# Patient Record
Sex: MALE | Race: WHITE | NOT HISPANIC OR LATINO | Employment: OTHER | URBAN - METROPOLITAN AREA
[De-identification: names, ages, dates, MRNs, and addresses within clinical notes are randomized per-mention and may not be internally consistent; named-entity substitution may affect disease eponyms.]

---

## 2019-04-10 ENCOUNTER — TELEPHONE (OUTPATIENT)
Dept: UROLOGY | Facility: CLINIC | Age: 84
End: 2019-04-10

## 2019-04-10 NOTE — TELEPHONE ENCOUNTER
----- Message from Gogo Diaz sent at 4/10/2019 11:38 AM CDT -----  Contact: Elyjose Chiang (Spouse)  Name of Who is Calling: Ely Chiang (Spouse)      What is the request in detail:    Patient's wife called requesting to schedule a new patient's appointment with Dr. Monteiro as per Dr. Drew Castro.  Please patient's wife a call back at your earliest convenience.     THANKS!      Can the clinic reply by [facility]: NO      Number to Call Back: Ely Chiang (Spouse) / # [phone]

## 2019-04-17 ENCOUNTER — OFFICE VISIT (OUTPATIENT)
Dept: CARDIOLOGY | Facility: CLINIC | Age: 84
End: 2019-04-17
Payer: MEDICARE

## 2019-04-17 ENCOUNTER — ANTI-COAG VISIT (OUTPATIENT)
Dept: CARDIOLOGY | Facility: CLINIC | Age: 84
End: 2019-04-17

## 2019-04-17 ENCOUNTER — TELEPHONE (OUTPATIENT)
Dept: CARDIOLOGY | Facility: CLINIC | Age: 84
End: 2019-04-17

## 2019-04-17 VITALS
WEIGHT: 171 LBS | HEIGHT: 68 IN | SYSTOLIC BLOOD PRESSURE: 124 MMHG | DIASTOLIC BLOOD PRESSURE: 59 MMHG | BODY MASS INDEX: 25.91 KG/M2 | HEART RATE: 63 BPM

## 2019-04-17 DIAGNOSIS — I10 ESSENTIAL HYPERTENSION: ICD-10-CM

## 2019-04-17 DIAGNOSIS — Z95.0 CARDIAC PACEMAKER: ICD-10-CM

## 2019-04-17 DIAGNOSIS — Z79.01 WARFARIN ANTICOAGULATION: ICD-10-CM

## 2019-04-17 DIAGNOSIS — I48.20 CHRONIC ATRIAL FIBRILLATION: ICD-10-CM

## 2019-04-17 DIAGNOSIS — E78.00 PURE HYPERCHOLESTEROLEMIA: ICD-10-CM

## 2019-04-17 DIAGNOSIS — I48.20 CHRONIC ATRIAL FIBRILLATION: Primary | ICD-10-CM

## 2019-04-17 PROCEDURE — 93000 ELECTROCARDIOGRAM COMPLETE: CPT | Mod: S$GLB,,, | Performed by: INTERNAL MEDICINE

## 2019-04-17 PROCEDURE — 93000 EKG 12-LEAD: ICD-10-PCS | Mod: S$GLB,,, | Performed by: INTERNAL MEDICINE

## 2019-04-17 PROCEDURE — 99205 OFFICE O/P NEW HI 60 MIN: CPT | Mod: S$GLB,,, | Performed by: INTERNAL MEDICINE

## 2019-04-17 PROCEDURE — 99205 PR OFFICE/OUTPT VISIT, NEW, LEVL V, 60-74 MIN: ICD-10-PCS | Mod: S$GLB,,, | Performed by: INTERNAL MEDICINE

## 2019-04-17 NOTE — PROGRESS NOTES
Subjective:      Patient ID: Stone Lucio is a 89 y.o. male.    Chief Complaint: Coronary Artery Disease (Establishing with a new cardiologist)    HPI:  Pt referred by Dr Castro for evaluation of chronic atrial fibrillation and f/u permanent pacemaker.    Pacemaker was placed years ago.    Pt walks with a walker    Pt is limited by chronic pain in LLE.    Review of Systems   Cardiovascular: Positive for leg swelling (minimal LLE). Negative for chest pain, claudication, dyspnea on exertion, irregular heartbeat, near-syncope, orthopnea, palpitations and syncope.      Pt is a retired  and   Past Medical History:   Diagnosis Date    Atrial fibrillation     Cancer     Hyperlipidemia     Hypertension     Prostate cancer     on Lupron        Past Surgical History:   Procedure Laterality Date    CHOLECYSTECTOMY      INSERT / REPLACE / REMOVE PACEMAKER      JOINT REPLACEMENT      left hip ORIF      SKIN CANCER DESTRUCTION         Family History   Problem Relation Age of Onset    Colon cancer Sister     Colon cancer Sister        Social History     Socioeconomic History    Marital status:      Spouse name: Not on file    Number of children: Not on file    Years of education: Not on file    Highest education level: Not on file   Occupational History    Not on file   Social Needs    Financial resource strain: Not on file    Food insecurity:     Worry: Not on file     Inability: Not on file    Transportation needs:     Medical: Not on file     Non-medical: Not on file   Tobacco Use    Smoking status: Former Smoker     Last attempt to quit: 1990     Years since quittin.0    Smokeless tobacco: Never Used   Substance and Sexual Activity    Alcohol use: Yes    Drug use: Not on file    Sexual activity: Not on file   Lifestyle    Physical activity:     Days per week: Not on file     Minutes per session: Not on file    Stress: Not on file   Relationships     Social connections:     Talks on phone: Not on file     Gets together: Not on file     Attends Hinduism service: Not on file     Active member of club or organization: Not on file     Attends meetings of clubs or organizations: Not on file     Relationship status: Not on file   Other Topics Concern    Not on file   Social History Narrative    Not on file       Current Outpatient Medications on File Prior to Visit   Medication Sig Dispense Refill    acetaminophen (TYLENOL) 325 MG tablet 1-2 tabs Q8h prn pain  0    amLODIPine (NORVASC) 2.5 MG tablet Take 1 tablet (2.5 mg total) by mouth once daily. 30 tablet 11    ascorbic acid, vitamin C, (VITAMIN C) 500 mg Chew 1 at bedtime.  Chew and swallow  0    bicalutamide (CASODEX) 50 MG Tab Take 1 tablet (50 mg total) by mouth once daily. 30 tablet 11    calcium carbonate (OS-STACY) 500 mg calcium (1,250 mg) tablet Take 1 tablet (500 mg total) by mouth once daily. May use 1-2 tabs q 6h prn indigestion  0    cholecalciferol, vitamin D3, (VITAMIN D3) 1,000 unit capsule Take 1 capsule (1,000 Units total) by mouth once daily.  0    docusate sodium (COLACE) 100 MG capsule 2 daily prn constipation  0    furosemide (LASIX) 20 MG tablet Take 1 tablet (20 mg total) by mouth once daily. 30 tablet 11    leuprolide acetate (LUPRON DEPOT IM) Inject into the muscle.      multivit with min-folic acid (ONE-A-DAY MEN VITACRAVES) 200 mcg Chew 1 daily      simvastatin (ZOCOR) 10 MG tablet Take 1 tablet (10 mg total) by mouth every evening. 30 tablet 11    warfarin (COUMADIN) 3 MG tablet Take 1.5 tabs in the evening on Sunday, Monday, Wednesday,Thursday,Friday 30 tablet 11    warfarin (COUMADIN) 6 MG tablet Take 1 tab in the evening on Tuesday and Saturday. 30 tablet 0     No current facility-administered medications on file prior to visit.        Review of patient's allergies indicates:  No Known Allergies  Objective:     Vitals:    04/17/19 1447   BP: (!) 124/59   BP  "Location: Left arm   Patient Position: Sitting   BP Method: Medium (Automatic)   Pulse: 63   Weight: 77.6 kg (171 lb)   Height: 5' 8" (1.727 m)        Physical Exam   Constitutional: He is oriented to person, place, and time. He appears well-developed and well-nourished. No distress.   Eyes: No scleral icterus.   Neck: No JVD present. Carotid bruit is not present.   Cardiovascular: An irregularly irregular rhythm present. Exam reveals no gallop and no friction rub.   Murmur (II/VI systolic) heard.  Pulmonary/Chest: Effort normal and breath sounds normal. No respiratory distress.   Musculoskeletal: He exhibits no edema.   Neurological: He is alert and oriented to person, place, and time.   Skin: Skin is warm and dry. He is not diaphoretic.   Psychiatric: He has a normal mood and affect. His behavior is normal. Judgment and thought content normal.   Vitals reviewed.     ECG: atrial fibrillation with controlled ventricular rate, RBBB, demand pacing    August lab:  LDL 63  HDL 61  TSH 1.93  Hgb 15  Creat 1.06  Assessment:     1. Chronic atrial fibrillation    2. Cardiac pacemaker    3. Essential hypertension    4. Pure hypercholesterolemia    5. Warfarin anticoagulation      Plan:   Stone was seen today for coronary artery disease.    Diagnoses and all orders for this visit:    Chronic atrial fibrillation  -     IN OFFICE EKG 12-LEAD (to [place])  -     Ambulatory consult to Anticoagulation Monitoring  -     CBC auto differential; Future  -     Comprehensive metabolic panel; Future  -     TSH; Future  -     Protime-INR; Standing    Cardiac pacemaker    Essential hypertension    Pure hypercholesterolemia  -     Lipid panel; Future    Warfarin anticoagulation  -     Ambulatory consult to Anticoagulation Monitoring  -     Protime-INR; Standing       Begin [employer] remote pacemaker interrogations (assuming pacemaker is [employer]    Consult coumadin clinic    Same meds    Discussed alternative of Eliquis    RTC " 3 months with [facility]    Follow up in about 3 months (around 7/17/2019), or with [facility].

## 2019-04-17 NOTE — PROGRESS NOTES
89 y/o male establishing care with new Cardiologist. His PMH is significant for atrial fibrillation (seems warfarin is not new), CAD, HTN, HLD, prostate cancer.    Seems patient is not new to warfarin. Will contact patient 4/18 to confirm dose shown in med list is correct and get him scheduled with us.

## 2019-04-17 NOTE — TELEPHONE ENCOUNTER
Phoned [facility] for information on patient's pacemaker.  Patient has a single lead pacemaker, model # K062, Serial # [id number], DOI 01/21/2013.

## 2019-04-17 NOTE — LETTER
April 17, 2019      Drew Castro MD  [address]  [address]  [facility] [address]           [facility]  [address], [address]  [facility] [address]  Phone: [phone]  Fax: [phone]          Patient: Stone Lucio   MR Number: [id number]   YOB: 1921   Date of Visit: 4/17/2019       Dear Dr. Drew Castro:    Thank you for referring Stone Lucio to me for evaluation. Attached you will find relevant portions of my assessment and plan of care.    If you have questions, please do not hesitate to call me. I look forward to following Stone Lucio along with you.    Sincerely,    Alberto Gonzales MD    Enclosure  CC:  No Recipients    If you would like to receive this communication electronically, please contact [email or web address] or [phone] to request more information on [employer] Link access.    For providers and/or their staff who would like to refer a patient to [facility], please contact us through our one-stop-shop provider referral line, [facility], at [phone].    If you feel you have received this communication in error or would no longer like to receive these types of communications, please e-mail [email or web address]

## 2019-04-18 NOTE — PROGRESS NOTES
I spoke to patient's wife per patient requested as she handles his health care.  He lives at [facility] for assisted living.  His wife brings him for appointments but [facility] dispenses his medications.  So he will need appointments scheduled with his wife and [facility] will need to be contacted when questioning and dosing needed.  Patient is schedule for an education clinic appointment on 4/25/19.  I left a message for Fernanda Montgomery at [facility] to call back to verify Coumadin and see how they will need dosing changes sent as they dispense his medications.    I spoke to Fernanda Montgomery at [facility] and she can be contacted for questions and dosing has to be faxed in an order format to [facility] as patient is there for assisted living. Fernanda verified the patient's Coumadin is 3 mg tablet strength which he gets 2 tabs on Tuesday, Saturday and 2 tabs all other days.  He has been on this dosing since he was admitted there on 3/12/2018

## 2019-04-25 ENCOUNTER — ANTI-COAG VISIT (OUTPATIENT)
Dept: CARDIOLOGY | Facility: CLINIC | Age: 84
End: 2019-04-25
Payer: MEDICARE

## 2019-04-25 DIAGNOSIS — Z79.01 LONG TERM (CURRENT) USE OF ANTICOAGULANTS: Primary | ICD-10-CM

## 2019-04-25 DIAGNOSIS — I48.20 CHRONIC ATRIAL FIBRILLATION: ICD-10-CM

## 2019-04-25 DIAGNOSIS — Z79.01 WARFARIN ANTICOAGULATION: ICD-10-CM

## 2019-04-25 LAB — INR PPP: 2.4 (ref 2–3)

## 2019-04-25 PROCEDURE — 93793 ANTICOAG MGMT PT WARFARIN: CPT | Mod: ,,,

## 2019-04-25 PROCEDURE — 85610 PROTHROMBIN TIME: CPT | Mod: PBBFAC

## 2019-04-25 PROCEDURE — 93793 PR ANTICOAGULANT MGMT FOR PT TAKING WARFARIN: ICD-10-PCS | Mod: ,,,

## 2019-04-25 NOTE — PROGRESS NOTES
Patient here for new patient education. He is not new to coumadin. He avoids greens, ETOH occasionally and denies any history of GI bleed. He reports minimal bruising and denies any bleeding. We will maintain this current dose and follow up in 2 weeks. A full discussion of the nature of anticoagulants has been carried out.  A benefit risk analysis has been presented to the patient, so that they understand the justification for choosing anticoagulation at this time. The need for frequent and regular monitoring, precise dosage adjustment and compliance is stressed.  Side effects of potential bleeding are discussed.  The patient should avoid any OTC items containing aspirin or ibuprofen, and should avoid great swings in general diet.  Avoid alcohol consumption.  Call if any signs of abnormal bleeding. Orders faxed to [facility]. Care plan made with MENDEZ GORE

## 2019-05-10 NOTE — PROGRESS NOTES
Wife called 05/10/19 to lanre appt 05/10/19 to 05/15/19. Patient ([place]isted [place]nee) and cannot get in car. Also poor weather conditions.

## 2019-05-15 ENCOUNTER — ANTI-COAG VISIT (OUTPATIENT)
Dept: CARDIOLOGY | Facility: CLINIC | Age: 84
End: 2019-05-15
Payer: MEDICARE

## 2019-05-15 DIAGNOSIS — I48.20 CHRONIC ATRIAL FIBRILLATION: ICD-10-CM

## 2019-05-15 DIAGNOSIS — Z79.01 WARFARIN ANTICOAGULATION: Primary | ICD-10-CM

## 2019-05-15 LAB — INR PPP: 2.1 (ref 2–3)

## 2019-05-15 PROCEDURE — 85610 PROTHROMBIN TIME: CPT | Mod: PBBFAC

## 2019-05-15 PROCEDURE — 93793 ANTICOAG MGMT PT WARFARIN: CPT | Mod: ,,, | Performed by: PHARMACIST

## 2019-05-15 PROCEDURE — 93793 PR ANTICOAGULANT MGMT FOR PT TAKING WARFARIN: ICD-10-PCS | Mod: ,,, | Performed by: PHARMACIST

## 2019-05-17 ENCOUNTER — OFFICE VISIT (OUTPATIENT)
Dept: UROLOGY | Facility: CLINIC | Age: 84
End: 2019-05-17
Payer: MEDICARE

## 2019-05-17 VITALS
DIASTOLIC BLOOD PRESSURE: 69 MMHG | HEART RATE: 75 BPM | BODY MASS INDEX: 25.92 KG/M2 | WEIGHT: 171.06 LBS | SYSTOLIC BLOOD PRESSURE: 135 MMHG | HEIGHT: 68 IN

## 2019-05-17 DIAGNOSIS — C61 PC (PROSTATE CANCER): Primary | ICD-10-CM

## 2019-05-17 PROCEDURE — 99204 PR OFFICE/OUTPT VISIT, NEW, LEVL IV, 45-59 MIN: ICD-10-PCS | Mod: S$GLB,,, | Performed by: UROLOGY

## 2019-05-17 PROCEDURE — 99204 OFFICE O/P NEW MOD 45 MIN: CPT | Mod: S$GLB,,, | Performed by: UROLOGY

## 2019-05-17 NOTE — PROGRESS NOTES
"Subjective:      Stone Lucio is a 89 y.o. male who was referred by Dr Castro for evaluation of prostate cancer.    The patient has been treated in [place] by Dr. Aguiar for proximally 10 years with androgen deprivation for prostate cancer  Under limited records are available    He tells me he was diagnosed due to elevated PSA.  He has been treated with Lupron injections and Casodex for about 10 years.  To his knowledge the disease was not metastatic at the time of diagnosis.  He has had four fractures but tells me these preceded his androgen deprivation      His last injection of Lupron was February 2019.  He and his wife were told that his next injection is due in August    Overall he is doing quite well and is very sharp for 89-year-old.  He lives in [facility].  Appetite is adequate no recent weight loss        The following portions of the patient's history were reviewed and updated as appropriate: allergies, current medications, past family history, past medical history, past social history, past surgical history and problem list.    Review of Systems  Constitutional: no fever or chills  ENT: no nasal congestion or sore throat  Respiratory: no cough or shortness of breath  Cardiovascular: no chest pain or palpitations  Gastrointestinal: no nausea or vomiting, tolerating diet  Genitourinary: as per HPI  Hematologic/Lymphatic: no easy bruising or lymphadenopathy  Musculoskeletal: no arthralgias or myalgias, Recently twisted his left knee  Neurological: no seizures or tremors  Behavioral/Psych: no auditory or visual hallucinations     Objective:   Vitals: /69 (BP Location: Left arm, Patient Position: Sitting, BP Method: Large (Automatic))   Pulse 75   Ht 5' 8" (1.727 m)   Wt 77.6 kg (171 lb 1.2 oz)   BMI 26.01 kg/m²     Physical Exam   General: alert and oriented, no acute distress  Head: normocephalic, atraumatic  Neck: normal ROM  Respiratory: Symmetric expansion, non-labored " breathing  Cardiovascular: no peripheral edema  Abdomen: soft, non tender, non distended, no palpable masses, no hernias, no hepatomegaly or splenomegaly  Genitourinary:     Prostate:  Fossa is smooth no masses no nodules; seminal vesicles not palpated  Rectum: normal rectal tone, no rectal mass, normal perineum  Lymphatic:  No axillary or cervical adenopathy  Skin: normal coloration and turgor, no rashes, no suspicious skin lesions noted  Neuro: alert and oriented x3, no gross deficits  Psych: normal judgment and insight, normal mood/affect and non-anxious  No bony tenderness    Physical Exam    Lab Review   Urinalysis demonstrates no specimen    PSA 0.2  2019    No results found for: WBC, HGB, HCT, MCV, PLT  No results found for: CREATININE, BUN  No results found for: PSA  Imaging  -  Assessment:     1. PC (prostate cancer)      Doing well on androgen deprivation  No staging information is available in the records received    Plan:     Orders Placed This Encounter    Prostate Specific Antigen, Diagnostic    Testosterone     We will recheck PSA and testosterone today  Continue Casodex 50 mg daily    Return to clinic August 2009 for Eligard 45 mg  Calcium and vitamin-D    Consider referral to Hematology Oncology to do to discuss Xgeva/ Zometa given his history of fractures and long-term androgen deprivation; will forward note to Dr. Castro

## 2019-06-12 ENCOUNTER — ANTI-COAG VISIT (OUTPATIENT)
Dept: CARDIOLOGY | Facility: CLINIC | Age: 84
End: 2019-06-12
Payer: MEDICARE

## 2019-06-12 DIAGNOSIS — Z79.01 WARFARIN ANTICOAGULATION: ICD-10-CM

## 2019-06-12 DIAGNOSIS — Z79.01 LONG TERM (CURRENT) USE OF ANTICOAGULANTS: Primary | ICD-10-CM

## 2019-06-12 DIAGNOSIS — I48.20 CHRONIC ATRIAL FIBRILLATION: ICD-10-CM

## 2019-06-12 LAB — INR PPP: 3.1 (ref 2–3)

## 2019-06-12 PROCEDURE — 93793 PR ANTICOAGULANT MGMT FOR PT TAKING WARFARIN: ICD-10-PCS | Mod: ,,,

## 2019-06-12 PROCEDURE — 85610 PROTHROMBIN TIME: CPT | Mod: PBBFAC

## 2019-06-12 PROCEDURE — 93793 ANTICOAG MGMT PT WARFARIN: CPT | Mod: ,,,

## 2019-06-12 NOTE — PROGRESS NOTES
INR not at goal. Medications, chart, and patient findings reviewed. See calendar for adjustments to dose and follow up plan.  Pt findings: Pt had diarrhea at the beginning of the week (due to food) & pt denies any other changes.  Will have pt take 1.5mg 6/12 & resume current regimen.  Plan to re-assess pt in 3 weeks.

## 2019-07-02 ENCOUNTER — ANTI-COAG VISIT (OUTPATIENT)
Dept: CARDIOLOGY | Facility: CLINIC | Age: 84
End: 2019-07-02
Payer: MEDICARE

## 2019-07-02 DIAGNOSIS — Z79.01 LONG TERM (CURRENT) USE OF ANTICOAGULANTS: Primary | ICD-10-CM

## 2019-07-02 DIAGNOSIS — I48.20 CHRONIC ATRIAL FIBRILLATION: ICD-10-CM

## 2019-07-02 DIAGNOSIS — Z79.01 WARFARIN ANTICOAGULATION: ICD-10-CM

## 2019-07-02 LAB — INR PPP: 2.7 (ref 2–3)

## 2019-07-02 PROCEDURE — 93793 PR ANTICOAGULANT MGMT FOR PT TAKING WARFARIN: ICD-10-PCS | Mod: ,,,

## 2019-07-02 PROCEDURE — 93793 ANTICOAG MGMT PT WARFARIN: CPT | Mod: ,,,

## 2019-07-02 PROCEDURE — 85610 PROTHROMBIN TIME: CPT | Mod: PBBFAC

## 2019-07-02 NOTE — PROGRESS NOTES
INR at goal. Medications and chart reviewed. No changes noted to necessitate adjustment of warfarin or follow-up plan. See calendar.  Pt denies any changes.  Will maintain current regimen & re-assess in 4 weeks.  Sent to [facility].   Patient was re-educated on situations that would require placing a call to the Coumadin Clinic, including bleeding or unusual bruising issues, changes in health, diet or medications,upcoming procedures that require warfarin interruption, and missed Coumadin dose(s). Patient expressed understanding that avoidance of consistency with these parameters could cause fluctuations in INR, leading to more frequent visits and increase risk of adverse events.

## 2019-07-30 ENCOUNTER — ANTI-COAG VISIT (OUTPATIENT)
Dept: CARDIOLOGY | Facility: CLINIC | Age: 84
End: 2019-07-30
Payer: MEDICARE

## 2019-07-30 DIAGNOSIS — Z79.01 WARFARIN ANTICOAGULATION: ICD-10-CM

## 2019-07-30 DIAGNOSIS — Z79.01 LONG TERM (CURRENT) USE OF ANTICOAGULANTS: Primary | ICD-10-CM

## 2019-07-30 DIAGNOSIS — I48.20 CHRONIC ATRIAL FIBRILLATION: ICD-10-CM

## 2019-07-30 LAB — INR PPP: 3.2 (ref 2–3)

## 2019-07-30 PROCEDURE — 93793 PR ANTICOAGULANT MGMT FOR PT TAKING WARFARIN: ICD-10-PCS | Mod: ,,,

## 2019-07-30 PROCEDURE — 93793 ANTICOAG MGMT PT WARFARIN: CPT | Mod: ,,,

## 2019-07-30 PROCEDURE — 85610 PROTHROMBIN TIME: CPT | Mod: PBBFAC

## 2019-07-30 NOTE — PROGRESS NOTES
INR not at goal. Medications, chart, and patient findings reviewed. See calendar for adjustments to dose and follow up plan.  Pt findings: Pt states he has been avoiding greens; pt has began to work more w/ a walker & is less dependent on his wheelchair; pt eats nuts; pt also takes Prevagen (apoaequorin) and denies any other changes.  Pt states he does not take Prevagen consistently (no DDI info available).  Pt's alcohol intake is also irregular.  Will reduce pt's maintenance dose preemptively due to inconsistency w/ alcohol intake.  Plan to re-assess in 2 weeks.   Patient was re-educated on situations that would require placing a call to the Coumadin Clinic, including bleeding or unusual bruising issues, changes in health, diet or medications,upcoming procedures that require warfarin interruption, and missed Coumadin dose(s). Patient expressed understanding that avoidance of consistency with these parameters could cause fluctuations in INR, leading to more frequent visits and increase risk of adverse events.

## 2019-08-02 ENCOUNTER — OFFICE VISIT (OUTPATIENT)
Dept: CARDIOLOGY | Facility: CLINIC | Age: 84
End: 2019-08-02
Payer: MEDICARE

## 2019-08-02 VITALS
HEART RATE: 82 BPM | DIASTOLIC BLOOD PRESSURE: 60 MMHG | WEIGHT: 163 LBS | BODY MASS INDEX: 24.71 KG/M2 | SYSTOLIC BLOOD PRESSURE: 129 MMHG | HEIGHT: 68 IN

## 2019-08-02 DIAGNOSIS — I69.398 ALTERED MOBILITY DUE TO OLD STROKE: ICD-10-CM

## 2019-08-02 DIAGNOSIS — E78.00 PURE HYPERCHOLESTEROLEMIA: ICD-10-CM

## 2019-08-02 DIAGNOSIS — R26.9 ALTERED MOBILITY DUE TO OLD STROKE: ICD-10-CM

## 2019-08-02 DIAGNOSIS — Z79.01 WARFARIN ANTICOAGULATION: ICD-10-CM

## 2019-08-02 DIAGNOSIS — I48.20 CHRONIC ATRIAL FIBRILLATION: Primary | ICD-10-CM

## 2019-08-02 DIAGNOSIS — I10 ESSENTIAL HYPERTENSION: ICD-10-CM

## 2019-08-02 DIAGNOSIS — Z95.0 CARDIAC PACEMAKER: ICD-10-CM

## 2019-08-02 PROCEDURE — 93000 EKG 12-LEAD: ICD-10-PCS | Mod: S$GLB,,, | Performed by: INTERNAL MEDICINE

## 2019-08-02 PROCEDURE — 99214 PR OFFICE/OUTPT VISIT, EST, LEVL IV, 30-39 MIN: ICD-10-PCS | Mod: S$GLB,,, | Performed by: INTERNAL MEDICINE

## 2019-08-02 PROCEDURE — 99214 OFFICE O/P EST MOD 30 MIN: CPT | Mod: S$GLB,,, | Performed by: INTERNAL MEDICINE

## 2019-08-02 PROCEDURE — 93000 ELECTROCARDIOGRAM COMPLETE: CPT | Mod: S$GLB,,, | Performed by: INTERNAL MEDICINE

## 2019-08-02 NOTE — PROGRESS NOTES
Subjective:      Patient ID: Stone Lucio is a 89 y.o. male.    Chief Complaint: Follow-up (Afib)    HPI:  Lives at [facility]    Remote hx of hip fx.    Fell again 2 months ago.  No syncope.  Pt was in the bathroom trying to reach something and slipped and fell.    Able to walk a block or two with the walker before having to stop due to tired leg.    Review of Systems   Cardiovascular: Negative for chest pain, claudication, dyspnea on exertion, irregular heartbeat, leg swelling, near-syncope, orthopnea, palpitations and syncope.        Past Medical History:   Diagnosis Date    Atrial fibrillation     Cancer     Hyperlipidemia     Hypertension     Prostate cancer     on Lupron        Past Surgical History:   Procedure Laterality Date    CHOLECYSTECTOMY      INSERT / REPLACE / REMOVE PACEMAKER      JOINT REPLACEMENT      left hip ORIF      SKIN CANCER DESTRUCTION         Family History   Problem Relation Age of Onset    Colon cancer Sister     Colon cancer Sister        Social History     Socioeconomic History    Marital status:      Spouse name: Not on file    Number of children: Not on file    Years of education: Not on file    Highest education level: Not on file   Occupational History    Not on file   Social Needs    Financial resource strain: Not on file    Food insecurity:     Worry: Not on file     Inability: Not on file    Transportation needs:     Medical: Not on file     Non-medical: Not on file   Tobacco Use    Smoking status: Former Smoker     Last attempt to quit: 1990     Years since quittin.3    Smokeless tobacco: Never Used   Substance and Sexual Activity    Alcohol use: Yes    Drug use: Never    Sexual activity: Not Currently   Lifestyle    Physical activity:     Days per week: Not on file     Minutes per session: Not on file    Stress: Not on file   Relationships    Social connections:     Talks on phone: Not on file     Gets together: Not on file      Attends Mosque service: Not on file     Active member of club or organization: Not on file     Attends meetings of clubs or organizations: Not on file     Relationship status: Not on file   Other Topics Concern    Not on file   Social History Narrative    Not on file       Current Outpatient Medications on File Prior to Visit   Medication Sig Dispense Refill    acetaminophen (TYLENOL) 325 MG tablet 1-2 tabs Q8h prn pain  0    amLODIPine (NORVASC) 2.5 MG tablet Take 1 tablet (2.5 mg total) by mouth once daily. 30 tablet 11    ascorbic acid, vitamin C, (VITAMIN C) 500 mg Chew 1 at bedtime.  Chew and swallow  0    bicalutamide (CASODEX) 50 MG Tab Take 1 tablet (50 mg total) by mouth once daily. 30 tablet 11    calcium carbonate (OS-STACY) 500 mg calcium (1,250 mg) tablet Take 1 tablet (500 mg total) by mouth once daily. May use 1-2 tabs q 6h prn indigestion  0    cholecalciferol, vitamin D3, (VITAMIN D3) 1,000 unit capsule Take 1 capsule (1,000 Units total) by mouth once daily.  0    docusate sodium (COLACE) 100 MG capsule 2 daily prn constipation  0    furosemide (LASIX) 20 MG tablet Take 1 tablet (20 mg total) by mouth once daily. 30 tablet 11    leuprolide acetate (LUPRON DEPOT IM) Inject into the muscle.      multivit with min-folic acid (ONE-A-DAY MEN VITACRAVES) 200 mcg Chew 1 daily      simvastatin (ZOCOR) 10 MG tablet Take 1 tablet (10 mg total) by mouth every evening. 30 tablet 11    warfarin (COUMADIN) 3 MG tablet Take 1.5 tabs in the evening on Sunday, Monday, Wednesday,Thursday,Friday 30 tablet 11    warfarin (COUMADIN) 6 MG tablet Take 1 tab in the evening on Tuesday and Saturday. 30 tablet 0     No current facility-administered medications on file prior to visit.        Review of patient's allergies indicates:  No Known Allergies  Objective:     Vitals:    08/02/19 0906 08/02/19 0943   BP: (!) 144/75 129/60   BP Location: Left arm Left arm   Patient Position: Sitting Sitting   BP  "Method: Large (Automatic)    Pulse: 82    Weight: 73.9 kg (163 lb)    Height: 5' 8" (1.727 m)         Physical Exam   Constitutional: He is oriented to person, place, and time. He appears well-developed and well-nourished. No distress.   Eyes: No scleral icterus.   Neck: No JVD present. Carotid bruit is not present.   Cardiovascular: Normal heart sounds. An irregularly irregular rhythm present. Exam reveals no gallop and no friction rub.   No murmur heard.  Pulmonary/Chest: Effort normal and breath sounds normal. No respiratory distress.   Musculoskeletal: He exhibits edema (trace pitting).   Neurological: He is alert and oriented to person, place, and time.   Skin: Skin is warm and dry. He is not diaphoretic.   Psychiatric: He has a normal mood and affect. His behavior is normal. Judgment and thought content normal.          ECG: atrial fibrillation with controlled ventricular response, RBBB, nonspecific STT abnormality    With magnet applied --VOO at 99 bpm    Last INR 3.2, warfarin dose reduced.    [facility] showed old stoke with right leg weakness in August of 2014  Pacemaker placed due to a fib with 3 second pauses  Echo showed normal LVEF and mod MR  No critical stenosis on carotid ultrasound  Assessment:     1. Chronic atrial fibrillation    2. 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 Cardiac pacemaker    4. Essential hypertension    5. Pure hypercholesterolemia    6. Warfarin anticoagulation      Plan:   Stone was seen today for follow-up.    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  "

## 2019-08-13 ENCOUNTER — TELEPHONE (OUTPATIENT)
Dept: CARDIOLOGY | Facility: CLINIC | Age: 84
End: 2019-08-13

## 2019-08-13 ENCOUNTER — ANTI-COAG VISIT (OUTPATIENT)
Dept: CARDIOLOGY | Facility: CLINIC | Age: 84
End: 2019-08-13
Payer: MEDICARE

## 2019-08-13 DIAGNOSIS — Z79.01 WARFARIN ANTICOAGULATION: ICD-10-CM

## 2019-08-13 DIAGNOSIS — Z79.01 LONG TERM (CURRENT) USE OF ANTICOAGULANTS: Primary | ICD-10-CM

## 2019-08-13 DIAGNOSIS — I48.20 CHRONIC ATRIAL FIBRILLATION: ICD-10-CM

## 2019-08-13 LAB — INR PPP: 2.5 (ref 2–3)

## 2019-08-13 PROCEDURE — 85610 PROTHROMBIN TIME: CPT | Mod: PBBFAC

## 2019-08-13 PROCEDURE — 93793 ANTICOAG MGMT PT WARFARIN: CPT | Mod: ,,,

## 2019-08-13 PROCEDURE — 93793 PR ANTICOAGULANT MGMT FOR PT TAKING WARFARIN: ICD-10-PCS | Mod: ,,,

## 2019-08-13 NOTE — PROGRESS NOTES
INR at goal. Medications and chart reviewed. No changes noted to necessitate adjustment of warfarin or follow-up plan. See calendar.  Pt denies any changes & had the same amount of alcohol intake within the past 2 weeks.  Will maintain the reduced dose & re-assess in 3 weeks.

## 2019-08-13 NOTE — TELEPHONE ENCOUNTER
Reached out to [facility] for patient's pacemaker information    Patient's device is a [employer] Model # K062 inplanted 01/21/2013.

## 2019-08-14 DIAGNOSIS — C61 PC (PROSTATE CANCER): Primary | ICD-10-CM

## 2019-08-23 ENCOUNTER — OFFICE VISIT (OUTPATIENT)
Dept: UROLOGY | Facility: CLINIC | Age: 84
End: 2019-08-23
Payer: MEDICARE

## 2019-08-23 VITALS
SYSTOLIC BLOOD PRESSURE: 123 MMHG | HEART RATE: 70 BPM | BODY MASS INDEX: 24.25 KG/M2 | HEIGHT: 68 IN | DIASTOLIC BLOOD PRESSURE: 60 MMHG | WEIGHT: 160 LBS

## 2019-08-23 DIAGNOSIS — C61 PC (PROSTATE CANCER): Primary | ICD-10-CM

## 2019-08-23 PROCEDURE — 99214 PR OFFICE/OUTPT VISIT, EST, LEVL IV, 30-39 MIN: ICD-10-PCS | Mod: 25,S$GLB,, | Performed by: UROLOGY

## 2019-08-23 PROCEDURE — 99214 OFFICE O/P EST MOD 30 MIN: CPT | Mod: 25,S$GLB,, | Performed by: UROLOGY

## 2019-08-23 PROCEDURE — 96402 PR CHEMOTHER HORMON ANTINEOPL SUB-Q/IM: ICD-10-PCS | Mod: S$GLB,,, | Performed by: UROLOGY

## 2019-08-23 PROCEDURE — 96402 CHEMO HORMON ANTINEOPL SQ/IM: CPT | Mod: S$GLB,,, | Performed by: UROLOGY

## 2019-08-23 NOTE — PROGRESS NOTES
"Subjective:      Stone Lucio is a 89 y.o. male who was referred by Dr Castro for evaluation of prostate cancer.    The patient has been treated in [place] by Dr. Aguiar for proximally 10 years with androgen deprivation for prostate cancer  Under limited records are available    He tells me he was diagnosed due to elevated PSA.  He has been treated with Lupron injections and Casodex for about 10 years.  To his knowledge the disease was not metastatic at the time of diagnosis.  He has had four fractures but tells me these preceded his androgen deprivation      His last injection of Lupron was February 2019.  He and his wife were told that his next injection is due in August    Overall he is doing quite well and is very sharp for 89-year-old.  He lives in [facility].  Appetite is adequate no recent weight loss        The following portions of the patient's history were reviewed and updated as appropriate: allergies, current medications, past family history, past medical history, past social history, past surgical history and problem list.    Review of Systems  Constitutional: no fever or chills  ENT: no nasal congestion or sore throat  Respiratory: no cough or shortness of breath  Cardiovascular: no chest pain or palpitations  Gastrointestinal: no nausea or vomiting, tolerating diet  Genitourinary: as per HPI  Hematologic/Lymphatic: no easy bruising or lymphadenopathy  Musculoskeletal: no arthralgias or myalgias, Recently twisted his left knee  Neurological: no seizures or tremors  Behavioral/Psych: no auditory or visual hallucinations     Objective:   Vitals: /60   Pulse 70   Ht 5' 8" (1.727 m)   Wt 72.6 kg (160 lb)   BMI 24.33 kg/m²     Physical Exam   General: alert and oriented, no acute distress  Head: normocephalic, atraumatic  Neck: normal ROM  Respiratory: Symmetric expansion, non-labored breathing  Cardiovascular: no peripheral edema  Abdomen: soft, non tender, non distended, no palpable masses, no " hernias, no hepatomegaly or splenomegaly  Genitourinary:     Prostate:  Fossa is smooth no masses no nodules; seminal vesicles not palpated  Rectum: normal rectal tone, no rectal mass, normal perineum  Lymphatic:  No axillary or cervical adenopathy  Skin: normal coloration and turgor, no rashes, no suspicious skin lesions noted  Neuro: alert and oriented x3, no gross deficits  Psych: normal judgment and insight, normal mood/affect and non-anxious  No bony tenderness    Physical Exam    Lab Review   Urinalysis demonstrates no specimen    PSA 0.2  2019; in 05/2019 PSA 0.12, Testosterone 15      Assessment:     1. PC (prostate cancer)      Doing well on androgen deprivation.   No staging information is available in the records received    Plan:        Eligard 45 given today.   Continue Casodex 50 mg daily, calcium, and vitamin D.   Discussed possibility of patient seeing Heme-Onc to discuss starting Xgeva; patient declines to visit Heme-Onc for now, he is happy with how he is doing.  RTC 6 months for Eligard. Will repeat PSA and testosterone then.

## 2019-09-05 ENCOUNTER — ANTI-COAG VISIT (OUTPATIENT)
Dept: CARDIOLOGY | Facility: CLINIC | Age: 84
End: 2019-09-05
Payer: MEDICARE

## 2019-09-05 DIAGNOSIS — Z79.01 WARFARIN ANTICOAGULATION: ICD-10-CM

## 2019-09-05 DIAGNOSIS — Z79.01 LONG TERM (CURRENT) USE OF ANTICOAGULANTS: Primary | ICD-10-CM

## 2019-09-05 DIAGNOSIS — I48.20 CHRONIC ATRIAL FIBRILLATION: ICD-10-CM

## 2019-09-05 LAB — INR PPP: 2.1 (ref 2–3)

## 2019-09-05 PROCEDURE — 93793 PR ANTICOAGULANT MGMT FOR PT TAKING WARFARIN: ICD-10-PCS | Mod: ,,,

## 2019-09-05 PROCEDURE — 85610 PROTHROMBIN TIME: CPT | Mod: PBBFAC

## 2019-09-05 PROCEDURE — 93793 ANTICOAG MGMT PT WARFARIN: CPT | Mod: ,,,

## 2019-09-05 NOTE — PROGRESS NOTES
INR at goal. Medications and chart reviewed. No changes noted to necessitate adjustment of warfarin or follow-up plan. See calendar.  Pt denies any changes.  Will maintain current regimen & re-assess in 3 weeks.  If pt remains stable plan to extend follow-up.   Patient was re-educated on situations that would require placing a call to the Coumadin Clinic, including bleeding or unusual bruising issues, changes in health, diet or medications,upcoming procedures that require warfarin interruption, and missed Coumadin dose(s). Patient expressed understanding that avoidance of consistency with these parameters could cause fluctuations in INR, leading to more frequent visits and increase risk of adverse events.

## 2019-09-24 ENCOUNTER — ANTI-COAG VISIT (OUTPATIENT)
Dept: CARDIOLOGY | Facility: CLINIC | Age: 84
End: 2019-09-24
Payer: MEDICARE

## 2019-09-24 DIAGNOSIS — I48.20 CHRONIC ATRIAL FIBRILLATION: ICD-10-CM

## 2019-09-24 DIAGNOSIS — Z79.01 WARFARIN ANTICOAGULATION: ICD-10-CM

## 2019-09-24 DIAGNOSIS — Z79.01 LONG TERM (CURRENT) USE OF ANTICOAGULANTS: Primary | ICD-10-CM

## 2019-09-24 LAB — INR PPP: 2.9 (ref 2–3)

## 2019-09-24 PROCEDURE — 93793 PR ANTICOAGULANT MGMT FOR PT TAKING WARFARIN: ICD-10-PCS | Mod: ,,,

## 2019-09-24 PROCEDURE — 85610 PROTHROMBIN TIME: CPT | Mod: PBBFAC

## 2019-09-24 PROCEDURE — 93793 ANTICOAG MGMT PT WARFARIN: CPT | Mod: ,,,

## 2019-09-24 NOTE — PROGRESS NOTES
INR at goal. Medications and chart reviewed. No changes noted to necessitate adjustment of warfarin or follow-up plan. See calendar.  Findings: Pt states he started drinking cranberry juice but doesn't plan to continue.  Will maintain current regimen & re-assess in 3 weeks.  If pt remains stable can extend visits out.   Patient was re-educated on situations that would require placing a call to the Coumadin Clinic, including bleeding or unusual bruising issues, changes in health, diet or medications,upcoming procedures that require warfarin interruption, and missed Coumadin dose(s). Patient expressed understanding that avoidance of consistency with these parameters could cause fluctuations in INR, leading to more frequent visits and increase risk of adverse events.

## 2019-10-15 ENCOUNTER — ANTI-COAG VISIT (OUTPATIENT)
Dept: CARDIOLOGY | Facility: CLINIC | Age: 84
End: 2019-10-15
Payer: MEDICARE

## 2019-10-15 DIAGNOSIS — Z79.01 LONG TERM (CURRENT) USE OF ANTICOAGULANTS: Primary | ICD-10-CM

## 2019-10-15 DIAGNOSIS — I48.20 CHRONIC ATRIAL FIBRILLATION: ICD-10-CM

## 2019-10-15 DIAGNOSIS — Z79.01 WARFARIN ANTICOAGULATION: ICD-10-CM

## 2019-10-15 LAB — INR PPP: 4.1 (ref 2–3)

## 2019-10-15 PROCEDURE — 85610 PROTHROMBIN TIME: CPT | Mod: PBBFAC

## 2019-10-15 PROCEDURE — 93793 ANTICOAG MGMT PT WARFARIN: CPT | Mod: ,,,

## 2019-10-15 PROCEDURE — 93793 PR ANTICOAGULANT MGMT FOR PT TAKING WARFARIN: ICD-10-PCS | Mod: ,,,

## 2019-10-15 NOTE — PROGRESS NOTES
INR not at goal. Medications, chart, and patient findings reviewed. See calendar for adjustments to dose and follow up plan.  Findings: Pt states he has had a decrease in his appetite & green intake; pt denies alcohol intake; bruising due to use & a fall; & denies any other changes. He says he has had ~1/3 decrease in his appetite.  Will instruct pt to hold coumadin 10/15 & decrease maintenance regimen.  Plan to re-assess in 2 weeks.   Patient was re-educated on situations that would require placing a call to the Coumadin Clinic, including bleeding or unusual bruising issues, changes in health, diet or medications,upcoming procedures that require warfarin interruption, and missed Coumadin dose(s). Patient expressed understanding that avoidance of consistency with these parameters could cause fluctuations in INR, leading to more frequent visits and increase risk of adverse events.

## 2019-10-29 ENCOUNTER — ANTI-COAG VISIT (OUTPATIENT)
Dept: CARDIOLOGY | Facility: CLINIC | Age: 84
End: 2019-10-29
Payer: MEDICARE

## 2019-10-29 DIAGNOSIS — Z79.01 LONG TERM (CURRENT) USE OF ANTICOAGULANTS: Primary | ICD-10-CM

## 2019-10-29 DIAGNOSIS — I48.20 CHRONIC ATRIAL FIBRILLATION: ICD-10-CM

## 2019-10-29 DIAGNOSIS — Z79.01 WARFARIN ANTICOAGULATION: ICD-10-CM

## 2019-10-29 LAB — INR PPP: 6.6 (ref 2–3)

## 2019-10-29 PROCEDURE — 93793 ANTICOAG MGMT PT WARFARIN: CPT | Mod: ,,,

## 2019-10-29 PROCEDURE — 85610 PROTHROMBIN TIME: CPT | Mod: PBBFAC

## 2019-10-29 PROCEDURE — 93793 PR ANTICOAGULANT MGMT FOR PT TAKING WARFARIN: ICD-10-PCS | Mod: ,,,

## 2019-10-29 RX ORDER — WARFARIN 3 MG/1
4.5 TABLET ORAL DAILY
Qty: 144 TABLET | Refills: 3 | Status: ON HOLD | OUTPATIENT
Start: 2019-10-29 | End: 2019-11-22 | Stop reason: HOSPADM

## 2019-10-29 NOTE — PROGRESS NOTES
INR not at goal. Medications, chart, and patient findings reviewed. See calendar for adjustments to dose and follow up plan.  Findings: Pt has bruising due to use; he states his appetite is about the same & he denies any other changes.  Despite holding coumadin & dosing reduction pt continues to increase.  Will instruct pt to hold 10/29-10/31.  Plan to re-assess 11/1.  Pt also instructed to have greens.

## 2019-11-01 ENCOUNTER — OFFICE VISIT (OUTPATIENT)
Dept: CARDIOLOGY | Facility: CLINIC | Age: 84
End: 2019-11-01
Payer: MEDICARE

## 2019-11-01 ENCOUNTER — LAB VISIT (OUTPATIENT)
Dept: LAB | Facility: OTHER | Age: 84
End: 2019-11-01
Attending: INTERNAL MEDICINE
Payer: MEDICARE

## 2019-11-01 VITALS
DIASTOLIC BLOOD PRESSURE: 62 MMHG | SYSTOLIC BLOOD PRESSURE: 109 MMHG | WEIGHT: 166 LBS | BODY MASS INDEX: 25.16 KG/M2 | HEIGHT: 68 IN | HEART RATE: 72 BPM

## 2019-11-01 DIAGNOSIS — I48.20 CHRONIC ATRIAL FIBRILLATION: ICD-10-CM

## 2019-11-01 DIAGNOSIS — E78.00 PURE HYPERCHOLESTEROLEMIA: ICD-10-CM

## 2019-11-01 DIAGNOSIS — I10 ESSENTIAL HYPERTENSION: ICD-10-CM

## 2019-11-01 DIAGNOSIS — Z79.01 LONG TERM (CURRENT) USE OF ANTICOAGULANTS: ICD-10-CM

## 2019-11-01 DIAGNOSIS — I69.398 ALTERED MOBILITY DUE TO OLD STROKE: ICD-10-CM

## 2019-11-01 DIAGNOSIS — Z79.01 WARFARIN ANTICOAGULATION: ICD-10-CM

## 2019-11-01 DIAGNOSIS — Z95.0 CARDIAC PACEMAKER: ICD-10-CM

## 2019-11-01 DIAGNOSIS — I48.20 CHRONIC ATRIAL FIBRILLATION: Primary | ICD-10-CM

## 2019-11-01 DIAGNOSIS — R26.9 ALTERED MOBILITY DUE TO OLD STROKE: ICD-10-CM

## 2019-11-01 LAB
ALBUMIN SERPL BCP-MCNC: 3.8 G/DL (ref 3.5–5.2)
ALP SERPL-CCNC: 116 U/L (ref 55–135)
ALT SERPL W/O P-5'-P-CCNC: 14 U/L (ref 10–44)
ANION GAP SERPL CALC-SCNC: 9 MMOL/L (ref 8–16)
AST SERPL-CCNC: 20 U/L (ref 10–40)
BASOPHILS # BLD AUTO: 0.04 K/UL (ref 0–0.2)
BASOPHILS NFR BLD: 0.5 % (ref 0–1.9)
BILIRUB SERPL-MCNC: 0.9 MG/DL (ref 0.1–1)
BUN SERPL-MCNC: 18 MG/DL (ref 10–30)
CALCIUM SERPL-MCNC: 10.4 MG/DL (ref 8.7–10.5)
CHLORIDE SERPL-SCNC: 105 MMOL/L (ref 95–110)
CHOLEST SERPL-MCNC: 156 MG/DL (ref 120–199)
CHOLEST/HDLC SERPL: 2.8 {RATIO} (ref 2–5)
CO2 SERPL-SCNC: 27 MMOL/L (ref 23–29)
CREAT SERPL-MCNC: 1.3 MG/DL (ref 0.5–1.4)
DIFFERENTIAL METHOD: ABNORMAL
EOSINOPHIL # BLD AUTO: 0.2 K/UL (ref 0–0.5)
EOSINOPHIL NFR BLD: 2.2 % (ref 0–8)
ERYTHROCYTE [DISTWIDTH] IN BLOOD BY AUTOMATED COUNT: 12.9 % (ref 11.5–14.5)
EST. GFR  (AFRICAN AMERICAN): 52 ML/MIN/1.73 M^2
EST. GFR  (NON AFRICAN AMERICAN): 45 ML/MIN/1.73 M^2
GLUCOSE SERPL-MCNC: 84 MG/DL (ref 70–110)
HCT VFR BLD AUTO: 46.7 % (ref 40–54)
HDLC SERPL-MCNC: 55 MG/DL (ref 40–75)
HDLC SERPL: 35.3 % (ref 20–50)
HGB BLD-MCNC: 14.5 G/DL (ref 14–18)
IMM GRANULOCYTES # BLD AUTO: 0.03 K/UL (ref 0–0.04)
IMM GRANULOCYTES NFR BLD AUTO: 0.4 % (ref 0–0.5)
LDLC SERPL CALC-MCNC: 84.2 MG/DL (ref 63–159)
LYMPHOCYTES # BLD AUTO: 1.6 K/UL (ref 1–4.8)
LYMPHOCYTES NFR BLD: 20 % (ref 18–48)
MCH RBC QN AUTO: 29.8 PG (ref 27–31)
MCHC RBC AUTO-ENTMCNC: 31 G/DL (ref 32–36)
MCV RBC AUTO: 96 FL (ref 82–98)
MONOCYTES # BLD AUTO: 0.6 K/UL (ref 0.3–1)
MONOCYTES NFR BLD: 7.6 % (ref 4–15)
NEUTROPHILS # BLD AUTO: 5.6 K/UL (ref 1.8–7.7)
NEUTROPHILS NFR BLD: 69.3 % (ref 38–73)
NONHDLC SERPL-MCNC: 101 MG/DL
NRBC BLD-RTO: 0 /100 WBC
PLATELET # BLD AUTO: 235 K/UL (ref 150–350)
PMV BLD AUTO: 10 FL (ref 9.2–12.9)
POTASSIUM SERPL-SCNC: 4.3 MMOL/L (ref 3.5–5.1)
PROT SERPL-MCNC: 7 G/DL (ref 6–8.4)
RBC # BLD AUTO: 4.86 M/UL (ref 4.6–6.2)
SODIUM SERPL-SCNC: 141 MMOL/L (ref 136–145)
TRIGL SERPL-MCNC: 84 MG/DL (ref 30–150)
TSH SERPL DL<=0.005 MIU/L-ACNC: 1.24 UIU/ML (ref 0.4–4)
WBC # BLD AUTO: 8.12 K/UL (ref 3.9–12.7)

## 2019-11-01 PROCEDURE — 93000 ELECTROCARDIOGRAM COMPLETE: CPT | Mod: 59,S$GLB,, | Performed by: INTERNAL MEDICINE

## 2019-11-01 PROCEDURE — 93288 INTERROG EVL PM/LDLS PM IP: CPT | Mod: 26,,, | Performed by: INTERNAL MEDICINE

## 2019-11-01 PROCEDURE — 36415 COLL VENOUS BLD VENIPUNCTURE: CPT

## 2019-11-01 PROCEDURE — 99214 PR OFFICE/OUTPT VISIT, EST, LEVL IV, 30-39 MIN: ICD-10-PCS | Mod: 25,S$GLB,, | Performed by: INTERNAL MEDICINE

## 2019-11-01 PROCEDURE — 99214 OFFICE O/P EST MOD 30 MIN: CPT | Mod: 25,S$GLB,, | Performed by: INTERNAL MEDICINE

## 2019-11-01 PROCEDURE — 80061 LIPID PANEL: CPT

## 2019-11-01 PROCEDURE — 85025 COMPLETE CBC W/AUTO DIFF WBC: CPT

## 2019-11-01 PROCEDURE — 93288 PR INTERROG EVAL, IN PERSON,PACEMAKER: ICD-10-PCS | Mod: 26,,, | Performed by: INTERNAL MEDICINE

## 2019-11-01 PROCEDURE — 93000 EKG 12-LEAD: ICD-10-PCS | Mod: 59,S$GLB,, | Performed by: INTERNAL MEDICINE

## 2019-11-01 PROCEDURE — 84443 ASSAY THYROID STIM HORMONE: CPT

## 2019-11-01 PROCEDURE — 80053 COMPREHEN METABOLIC PANEL: CPT

## 2019-11-01 NOTE — PROGRESS NOTES
"  Subjective:      Patient ID: Stone Lucio is a 89 y.o. male.    Chief Complaint: Follow-up    HPI:  Wife reports pt is weaker than before.  Pt walks with walker.  Pt is limited by weakness of left leg especially.    INR went up to 6.   Warfarin is on hold.  Pt has his meds administered by a nurse provided by [facility]    Pt is being managed by coumadin clinic    Appetite may be off.    Pt slipped and fell in the bathroom and bumped eye.  "I got dizzy and fell and banged head."    Review of Systems   Cardiovascular: Negative for chest pain, claudication, dyspnea on exertion, irregular heartbeat, leg swelling, near-syncope, orthopnea, palpitations and syncope.      Pt is often dizzy in the mornings      Past Medical History:   Diagnosis Date    Atrial fibrillation     Cancer     Hyperlipidemia     Hypertension     Prostate cancer     on Lupron        Past Surgical History:   Procedure Laterality Date    CHOLECYSTECTOMY      INSERT / REPLACE / REMOVE PACEMAKER      JOINT REPLACEMENT      left hip ORIF      SKIN CANCER DESTRUCTION         Family History   Problem Relation Age of Onset    Colon cancer Sister     Colon cancer Sister        Social History     Socioeconomic History    Marital status:      Spouse name: Not on file    Number of children: Not on file    Years of education: Not on file    Highest education level: Not on file   Occupational History    Not on file   Social Needs    Financial resource strain: Not on file    Food insecurity:     Worry: Not on file     Inability: Not on file    Transportation needs:     Medical: Not on file     Non-medical: Not on file   Tobacco Use    Smoking status: Former Smoker     Last attempt to quit: 1990     Years since quittin.5    Smokeless tobacco: Never Used   Substance and Sexual Activity    Alcohol use: Yes    Drug use: Never    Sexual activity: Not Currently   Lifestyle "    Physical activity:     Days per week: Not on file     Minutes per session: Not on file    Stress: Not on file   Relationships    Social connections:     Talks on phone: Not on file     Gets together: Not on file     Attends Voodoo service: Not on file     Active member of club or organization: Not on file     Attends meetings of clubs or organizations: Not on file     Relationship status: Not on file   Other Topics Concern    Not on file   Social History Narrative    Not on file       Current Outpatient Medications on File Prior to Visit   Medication Sig Dispense Refill    acetaminophen (TYLENOL) 325 MG tablet 1-2 tabs Q8h prn pain  0    ascorbic acid, vitamin C, (VITAMIN C) 500 mg Chew 1 at bedtime.  Chew and swallow  0    bicalutamide (CASODEX) 50 MG Tab Take 1 tablet (50 mg total) by mouth once daily. 30 tablet 11    calcium carbonate (OS-STACY) 500 mg calcium (1,250 mg) tablet Take 1 tablet (500 mg total) by mouth once daily. May use 1-2 tabs q 6h prn indigestion  0    cholecalciferol, vitamin D3, (VITAMIN D3) 1,000 unit capsule Take 1 capsule (1,000 Units total) by mouth once daily.  0    docusate sodium (COLACE) 100 MG capsule 2 daily prn constipation  0    furosemide (LASIX) 20 MG tablet Take 1 tablet (20 mg total) by mouth once daily. 30 tablet 11    leuprolide acetate (LUPRON DEPOT IM) Inject into the muscle.      loratadine (CLARITIN) 10 mg tablet One every morning for runny nose 30 tablet 12    multivit with min-folic acid (ONE-A-DAY MEN VITACRAVES) 200 mcg Chew 1 daily      simvastatin (ZOCOR) 10 MG tablet Take 1 tablet (10 mg total) by mouth every evening. 30 tablet 11    [DISCONTINUED] amLODIPine (NORVASC) 2.5 MG tablet Take 1 tablet (2.5 mg total) by mouth once daily. 30 tablet 11    warfarin (COUMADIN) 3 MG tablet Take 1.5 tabs in the evening on Sunday, Monday, Wednesday,Thursday,Friday (Patient not taking: Reported on 11/1/2019) 30 tablet 11    warfarin (COUMADIN) 3 MG tablet  "Take 1.5 tablets (4.5 mg total) by mouth Daily. Current dose ( 3 mg every Wed, Fri; 4.5 mg all other days) or as directed by coumadin clinic. (Patient not taking: Reported on 11/1/2019) 144 tablet 3    warfarin (COUMADIN) 6 MG tablet Take 1 tab in the evening on Tuesday and Saturday. (Patient not taking: Reported on 11/1/2019) 30 tablet 0     Current Facility-Administered Medications on File Prior to Visit   Medication Dose Route Frequency Provider Last Rate Last Dose    leuprolide (6 month) (ELIGARD) injection 45 mg  45 mg Subcutaneous Q6 Months Abisai Monteiro MD   45 mg at 08/23/19 1008    [START ON 1/23/2020] leuprolide (6 month) (ELIGARD) injection 45 mg  45 mg Subcutaneous Q6 Months Abisai Monteiro MD           Review of patient's allergies indicates:  No Known Allergies  Objective:     Vitals:    11/01/19 0936   BP: 109/62   BP Location: Left arm   Patient Position: Sitting   Pulse: 72   Weight: 75.3 kg (166 lb)   Height: 5' 8" (1.727 m)        Physical Exam   Constitutional: He is oriented to person, place, and time. He appears well-developed and well-nourished. No distress.   Eyes: No scleral icterus.   Neck: No JVD present. Carotid bruit is not present.   Cardiovascular: An irregularly irregular rhythm present. Exam reveals no gallop and no friction rub.   Murmur (II/VI systolic) heard.  Pulmonary/Chest: Effort normal and breath sounds normal. No respiratory distress.   Musculoskeletal: He exhibits edema (trace pitting edema bilaterally RLE>LLE).   Neurological: He is alert and oriented to person, place, and time.   Skin: Skin is warm and dry. He is not diaphoretic.   Psychiatric: He has a normal mood and affect. His behavior is normal. Judgment and thought content normal.   Vitals reviewed.     Wt up 6 lbs    ECG: atrial fibrillation with controlled ventricular response, RBBB, nonspecfic STT abnormality.      Pacemaker interrogated in office:  ROCKY 4.5 years, normal device function.  Assessment: "     1. Chronic atrial fibrillation    2. Cardiac pacemaker    3. Essential hypertension    4. Pure hypercholesterolemia    5. Warfarin anticoagulation    6. 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    Diagnoses and all orders for this visit:    Chronic atrial fibrillation  -     IN OFFICE EKG 12-LEAD (to Muse)    Cardiac pacemaker    Essential hypertension    Pure hypercholesterolemia    Warfarin anticoagulation    Altered mobility due to old stroke     Will discontinue the amlodipine since fall may have been due to orthostatic hypotension    Rest of meds the same    May nbeed to hold the furosemide as well    Follow up in about 3 months (around 2/1/2020).     Check labs    F/u with Dr Castro in one month    [employer] pacemaker check today.  Remote monitoring will be changed from [place] to me.

## 2019-11-01 NOTE — PROGRESS NOTES
11/1/19-Orders not linked for INR.  Will recommend pt hold coumadin 11/1/19 & decrease maintenance dose.  Plan to re-assess 11/4/19.

## 2019-11-04 ENCOUNTER — LAB VISIT (OUTPATIENT)
Dept: LAB | Facility: OTHER | Age: 84
End: 2019-11-04
Attending: INTERNAL MEDICINE
Payer: MEDICARE

## 2019-11-04 ENCOUNTER — ANTI-COAG VISIT (OUTPATIENT)
Dept: CARDIOLOGY | Facility: CLINIC | Age: 84
End: 2019-11-04
Payer: MEDICARE

## 2019-11-04 DIAGNOSIS — Z79.01 LONG TERM (CURRENT) USE OF ANTICOAGULANTS: ICD-10-CM

## 2019-11-04 DIAGNOSIS — I48.20 CHRONIC ATRIAL FIBRILLATION: ICD-10-CM

## 2019-11-04 DIAGNOSIS — Z79.01 WARFARIN ANTICOAGULATION: ICD-10-CM

## 2019-11-04 LAB
INR PPP: 1 (ref 0.8–1.2)
PROTHROMBIN TIME: 11.6 SEC (ref 9–12.5)

## 2019-11-04 PROCEDURE — 93793 PR ANTICOAGULANT MGMT FOR PT TAKING WARFARIN: ICD-10-PCS | Mod: ,,,

## 2019-11-04 PROCEDURE — 36415 COLL VENOUS BLD VENIPUNCTURE: CPT

## 2019-11-04 PROCEDURE — 85610 PROTHROMBIN TIME: CPT

## 2019-11-04 PROCEDURE — 93793 ANTICOAG MGMT PT WARFARIN: CPT | Mod: ,,,

## 2019-11-11 ENCOUNTER — ANTI-COAG VISIT (OUTPATIENT)
Dept: CARDIOLOGY | Facility: CLINIC | Age: 84
End: 2019-11-11
Payer: MEDICARE

## 2019-11-11 ENCOUNTER — LAB VISIT (OUTPATIENT)
Dept: LAB | Facility: OTHER | Age: 84
End: 2019-11-11
Attending: INTERNAL MEDICINE
Payer: MEDICARE

## 2019-11-11 DIAGNOSIS — I48.20 CHRONIC ATRIAL FIBRILLATION: ICD-10-CM

## 2019-11-11 DIAGNOSIS — Z79.01 LONG TERM (CURRENT) USE OF ANTICOAGULANTS: ICD-10-CM

## 2019-11-11 DIAGNOSIS — Z79.01 WARFARIN ANTICOAGULATION: ICD-10-CM

## 2019-11-11 LAB
INR PPP: 1.1 (ref 0.8–1.2)
PROTHROMBIN TIME: 12.7 SEC (ref 9–12.5)

## 2019-11-11 PROCEDURE — 93793 PR ANTICOAGULANT MGMT FOR PT TAKING WARFARIN: ICD-10-PCS | Mod: ,,,

## 2019-11-11 PROCEDURE — 93793 ANTICOAG MGMT PT WARFARIN: CPT | Mod: ,,,

## 2019-11-11 PROCEDURE — 85610 PROTHROMBIN TIME: CPT

## 2019-11-11 PROCEDURE — 36415 COLL VENOUS BLD VENIPUNCTURE: CPT

## 2019-11-16 ENCOUNTER — HOSPITAL ENCOUNTER (INPATIENT)
Facility: OTHER | Age: 84
LOS: 3 days | Discharge: HOME HEALTH | DRG: 394 | End: 2019-11-22
Attending: EMERGENCY MEDICINE | Admitting: FAMILY MEDICINE
Payer: MEDICARE

## 2019-11-16 DIAGNOSIS — K64.1 GRADE II HEMORRHOIDS: ICD-10-CM

## 2019-11-16 DIAGNOSIS — E78.00 PURE HYPERCHOLESTEROLEMIA: ICD-10-CM

## 2019-11-16 DIAGNOSIS — K64.8 BLEEDING INTERNAL HEMORRHOIDS: ICD-10-CM

## 2019-11-16 DIAGNOSIS — Z85.46 HISTORY OF PROSTATE CANCER: ICD-10-CM

## 2019-11-16 DIAGNOSIS — I48.20 CHRONIC ATRIAL FIBRILLATION: ICD-10-CM

## 2019-11-16 DIAGNOSIS — Z79.01 WARFARIN ANTICOAGULATION: ICD-10-CM

## 2019-11-16 DIAGNOSIS — K62.5 RECTAL BLEEDING: Primary | ICD-10-CM

## 2019-11-16 DIAGNOSIS — Z95.0 CARDIAC PACEMAKER: ICD-10-CM

## 2019-11-16 DIAGNOSIS — R53.81 DEBILITY: ICD-10-CM

## 2019-11-16 DIAGNOSIS — I10 ESSENTIAL HYPERTENSION: ICD-10-CM

## 2019-11-16 LAB
ABO + RH BLD: NORMAL
ALBUMIN SERPL BCP-MCNC: 3.8 G/DL (ref 3.5–5.2)
ALP SERPL-CCNC: 86 U/L (ref 55–135)
ALT SERPL W/O P-5'-P-CCNC: 14 U/L (ref 10–44)
ANION GAP SERPL CALC-SCNC: 11 MMOL/L (ref 8–16)
AST SERPL-CCNC: 19 U/L (ref 10–40)
BASOPHILS # BLD AUTO: 0.03 K/UL (ref 0–0.2)
BASOPHILS NFR BLD: 0.4 % (ref 0–1.9)
BILIRUB SERPL-MCNC: 0.5 MG/DL (ref 0.1–1)
BLD GP AB SCN CELLS X3 SERPL QL: NORMAL
BUN SERPL-MCNC: 23 MG/DL (ref 10–30)
CALCIUM SERPL-MCNC: 10.2 MG/DL (ref 8.7–10.5)
CHLORIDE SERPL-SCNC: 108 MMOL/L (ref 95–110)
CO2 SERPL-SCNC: 24 MMOL/L (ref 23–29)
CREAT SERPL-MCNC: 1.2 MG/DL (ref 0.5–1.4)
DIFFERENTIAL METHOD: ABNORMAL
EOSINOPHIL # BLD AUTO: 0.2 K/UL (ref 0–0.5)
EOSINOPHIL NFR BLD: 2.6 % (ref 0–8)
ERYTHROCYTE [DISTWIDTH] IN BLOOD BY AUTOMATED COUNT: 13.3 % (ref 11.5–14.5)
EST. GFR  (AFRICAN AMERICAN): 58 ML/MIN/1.73 M^2
EST. GFR  (NON AFRICAN AMERICAN): 50 ML/MIN/1.73 M^2
GLUCOSE SERPL-MCNC: 98 MG/DL (ref 70–110)
HCT VFR BLD AUTO: 40.5 % (ref 40–54)
HGB BLD-MCNC: 13.1 G/DL (ref 14–18)
IMM GRANULOCYTES # BLD AUTO: 0.02 K/UL (ref 0–0.04)
IMM GRANULOCYTES NFR BLD AUTO: 0.3 % (ref 0–0.5)
INR PPP: 3.1 (ref 0.8–1.2)
LYMPHOCYTES # BLD AUTO: 1.6 K/UL (ref 1–4.8)
LYMPHOCYTES NFR BLD: 22.6 % (ref 18–48)
MCH RBC QN AUTO: 31 PG (ref 27–31)
MCHC RBC AUTO-ENTMCNC: 32.3 G/DL (ref 32–36)
MCV RBC AUTO: 96 FL (ref 82–98)
MONOCYTES # BLD AUTO: 0.4 K/UL (ref 0.3–1)
MONOCYTES NFR BLD: 6.1 % (ref 4–15)
NEUTROPHILS # BLD AUTO: 4.9 K/UL (ref 1.8–7.7)
NEUTROPHILS NFR BLD: 68 % (ref 38–73)
NRBC BLD-RTO: 0 /100 WBC
PLATELET # BLD AUTO: 208 K/UL (ref 150–350)
PMV BLD AUTO: 10.2 FL (ref 9.2–12.9)
POTASSIUM SERPL-SCNC: 3.8 MMOL/L (ref 3.5–5.1)
PROT SERPL-MCNC: 6.6 G/DL (ref 6–8.4)
PROTHROMBIN TIME: 34.5 SEC (ref 9–12.5)
RBC # BLD AUTO: 4.22 M/UL (ref 4.6–6.2)
SODIUM SERPL-SCNC: 143 MMOL/L (ref 136–145)
WBC # BLD AUTO: 7.18 K/UL (ref 3.9–12.7)

## 2019-11-16 PROCEDURE — 93010 EKG 12-LEAD: ICD-10-PCS | Mod: ,,, | Performed by: INTERNAL MEDICINE

## 2019-11-16 PROCEDURE — 96374 THER/PROPH/DIAG INJ IV PUSH: CPT | Performed by: EMERGENCY MEDICINE

## 2019-11-16 PROCEDURE — C9113 INJ PANTOPRAZOLE SODIUM, VIA: HCPCS | Performed by: PHYSICIAN ASSISTANT

## 2019-11-16 PROCEDURE — 85025 COMPLETE CBC W/AUTO DIFF WBC: CPT

## 2019-11-16 PROCEDURE — 25000003 PHARM REV CODE 250: Performed by: PHYSICIAN ASSISTANT

## 2019-11-16 PROCEDURE — 99285 EMERGENCY DEPT VISIT HI MDM: CPT | Mod: 25

## 2019-11-16 PROCEDURE — 36415 COLL VENOUS BLD VENIPUNCTURE: CPT

## 2019-11-16 PROCEDURE — G0378 HOSPITAL OBSERVATION PER HR: HCPCS

## 2019-11-16 PROCEDURE — 63600175 PHARM REV CODE 636 W HCPCS: Performed by: PHYSICIAN ASSISTANT

## 2019-11-16 PROCEDURE — 93010 ELECTROCARDIOGRAM REPORT: CPT | Mod: ,,, | Performed by: INTERNAL MEDICINE

## 2019-11-16 PROCEDURE — 94761 N-INVAS EAR/PLS OXIMETRY MLT: CPT

## 2019-11-16 PROCEDURE — 99220 PR INITIAL OBSERVATION CARE,LEVL III: ICD-10-PCS | Mod: ,,, | Performed by: PHYSICIAN ASSISTANT

## 2019-11-16 PROCEDURE — 86901 BLOOD TYPING SEROLOGIC RH(D): CPT

## 2019-11-16 PROCEDURE — 93005 ELECTROCARDIOGRAM TRACING: CPT

## 2019-11-16 PROCEDURE — 99220 PR INITIAL OBSERVATION CARE,LEVL III: CPT | Mod: ,,, | Performed by: PHYSICIAN ASSISTANT

## 2019-11-16 PROCEDURE — 80053 COMPREHEN METABOLIC PANEL: CPT

## 2019-11-16 PROCEDURE — 85610 PROTHROMBIN TIME: CPT

## 2019-11-16 RX ORDER — SODIUM CHLORIDE 0.9 % (FLUSH) 0.9 %
10 SYRINGE (ML) INJECTION
Status: DISCONTINUED | OUTPATIENT
Start: 2019-11-16 | End: 2019-11-22 | Stop reason: HOSPADM

## 2019-11-16 RX ORDER — BICALUTAMIDE 50 MG/1
50 TABLET, FILM COATED ORAL DAILY
Status: DISCONTINUED | OUTPATIENT
Start: 2019-11-17 | End: 2019-11-22 | Stop reason: HOSPADM

## 2019-11-16 RX ORDER — PANTOPRAZOLE SODIUM 40 MG/10ML
40 INJECTION, POWDER, LYOPHILIZED, FOR SOLUTION INTRAVENOUS DAILY
Status: DISCONTINUED | OUTPATIENT
Start: 2019-11-16 | End: 2019-11-18

## 2019-11-16 RX ORDER — SIMVASTATIN 10 MG/1
10 TABLET, FILM COATED ORAL NIGHTLY
Status: DISCONTINUED | OUTPATIENT
Start: 2019-11-16 | End: 2019-11-22 | Stop reason: HOSPADM

## 2019-11-16 RX ORDER — ACETAMINOPHEN 325 MG/1
325 TABLET ORAL EVERY 6 HOURS PRN
Status: DISCONTINUED | OUTPATIENT
Start: 2019-11-16 | End: 2019-11-22 | Stop reason: HOSPADM

## 2019-11-16 RX ORDER — ONDANSETRON 2 MG/ML
4 INJECTION INTRAMUSCULAR; INTRAVENOUS EVERY 6 HOURS PRN
Status: DISCONTINUED | OUTPATIENT
Start: 2019-11-16 | End: 2019-11-22 | Stop reason: HOSPADM

## 2019-11-16 RX ADMIN — SIMVASTATIN 10 MG: 10 TABLET, FILM COATED ORAL at 08:11

## 2019-11-16 RX ADMIN — PANTOPRAZOLE SODIUM 40 MG: 40 INJECTION, POWDER, FOR SOLUTION INTRAVENOUS at 04:11

## 2019-11-16 NOTE — ED PROVIDER NOTES
Encounter Date: 2019    SCRIBE #1 NOTE: I, Raji Hawkins, am scribing for, and in the presence of,  Dr. Sorenson. I have scribed the entire note.       History     Chief Complaint   Patient presents with    Rectal Bleeding     [facility] w/ reports of + mohsen onset of bright red blood to rectum x 2 days. Denies fatigue, weakness or dizziness. + blood thinners     Time patient was seen by the provider: 9:50 AM    The patient is a 89 y.o. male with a past medical history of AFIB, HTN, and history of CVA who presents to the ED with a complaint of rectal bleeding for the past week, worse today. He also reports increased fatigue and slight decrease in appetite. He states he first noticed blood on his toilet paper with wiping a week ago.  Today he had a BM and noted a lot of blood in the toilet, not only with wiping. He has been on Coumadin for years for AFIB with no history of similar previous episodes. He denies bleeding anywhere else, shortness of breath, chest pain, and abdominal pain.     The history is provided by the patient.     Review of patient's allergies indicates:  No Known Allergies  Past Medical History:   Diagnosis Date    Atrial fibrillation     Cancer     Hyperlipidemia     Hypertension     Prostate cancer     on Lupron     Past Surgical History:   Procedure Laterality Date    CHOLECYSTECTOMY      INSERT / REPLACE / REMOVE PACEMAKER      JOINT REPLACEMENT      left hip ORIF      SKIN CANCER DESTRUCTION       Family History   Problem Relation Age of Onset    Colon cancer Sister     Colon cancer Sister      Social History     Tobacco Use    Smoking status: Former Smoker     Last attempt to quit: 1990     Years since quittin.6    Smokeless tobacco: Never Used   Substance Use Topics    Alcohol use: Yes    Drug use: Never     Review of Systems   Constitutional: Positive for appetite change and fatigue.   HENT: Negative for rhinorrhea and sore  throat.    Eyes: Negative for photophobia and visual disturbance.   Respiratory: Negative for cough and shortness of breath.    Cardiovascular: Negative for chest pain.   Gastrointestinal: Positive for anal bleeding and blood in stool. Negative for abdominal pain, nausea and vomiting.   Genitourinary: Negative for difficulty urinating and dysuria.   Musculoskeletal: Negative for back pain and neck pain.   Skin: Negative for pallor.   Neurological: Negative for dizziness and headaches.       Physical Exam     Initial Vitals [11/16/19 0913]   BP Pulse Resp Temp SpO2   (!) 144/71 (!) 55 16 97.6 °F (36.4 °C) 98 %      MAP       --         Physical Exam    Nursing note and vitals reviewed.  Constitutional: He appears well-developed and well-nourished.   HENT:   Head: Normocephalic and atraumatic.   Eyes: Conjunctivae and EOM are normal. Pupils are equal, round, and reactive to light.   Neck: Normal range of motion. Neck supple.   Cardiovascular: Normal heart sounds, intact distal pulses and normal pulses.   No murmur heard.  Pulmonary/Chest: Breath sounds normal. He has no wheezes. He has no rhonchi. He has no rales.   Abdominal: Soft. There is no tenderness. There is no rebound and no guarding.   Genitourinary:   Genitourinary Comments: Rectal: No external hemorrhoids. Dark red blood in rectal bulb.   Musculoskeletal: He exhibits no tenderness.   Neurological: He is alert and oriented to person, place, and time.   Skin: Skin is warm and dry.   Psychiatric: He has a normal mood and affect. His behavior is normal. Thought content normal.         ED Course   Procedures  Labs Reviewed   CBC W/ AUTO DIFFERENTIAL - Abnormal; Notable for the following components:       Result Value    RBC 4.22 (*)     Hemoglobin 13.1 (*)     All other components within normal limits   COMPREHENSIVE METABOLIC PANEL - Abnormal; Notable for the following components:    eGFR if  58 (*)     eGFR if non  50 (*)      All other components within normal limits   PROTIME-INR - Abnormal; Notable for the following components:    Prothrombin Time 34.5 (*)     INR 3.1 (*)     All other components within normal limits   TYPE & SCREEN     EKG Readings: (Independently Interpreted)   Rhythm: Atrial Fibrillation. Heart Rate: 58.   Occasional pacemaker spikes. Right bundle pattern unchanged from previous.      ECG Results          EKG 12-lead (In process)  Result time 11/16/19 11:44:37    In process by Interface, [facility] (11/16/19 11:44:37)                 Narrative:    Test Reason : R58,    Vent. Rate : 058 BPM     Atrial Rate : 065 BPM     P-R Int : 000 ms          QRS Dur : 132 ms      QT Int : 462 ms       P-R-T Axes : 000 056 -16 degrees     QTc Int : 453 ms    Atrial fibrillation with slow ventricular response with occasional   ventricular-paced complexes  Right bundle branch block  Abnormal ECG      Referred By: [email or web address]   SELF           Confirmed By:                             Imaging Results    None          Medical Decision Making:   History:   Old Medical Records: I decided to obtain old medical records.  Initial Assessment:       89-year-old male with history of AFib on Coumadin, HTN, CHF presents from [facility] with an episode of bloody stool this morning.  Patient states he has noticed some blood with wiping over the past 8-10 days, but normal BMs, until this morning when he noticed gross blood in stool.  No associated abdominal pain, and no known history of similar previous episodes.  He has been on Coumadin for years with no known bleeding complications, and his INR has recently been subtherapeutic so his dose has been recently adjusted.  He reports some decreased appetite and minimally increased fatigue above baseline, but no associated CP/SOB or other new complaints.   Patient is well-appearing on arrival with normal vitals, with AFib in 60s.  No abdominal tenderness or other concerning exam findings.   Description of blood with wiping concerning for hemorrhoids, but no sign external hemorrhoids on rectal exam, he does have dark red stool in rectal vault.       Initial labs with INR 3.1.  Hemoglobin 13.1, slightly decreased from 14.5 2 weeks ago.  Patient remained hemodynamically stable in ED, but after 4 hours of observation in ED he had another BM with dark blood.  I suspect diverticulosis or AVM as most likely etiology. He will need admission for close observation to ensure no worsening or hemodynamically instability or need for transfusion. Discussed with Dr. Holguin, GI on-call, who agrees with admission and does not recommend Coumadin reversal at this time since he is still hemodynamically stable. Discussed with hospitalist, who agrees with initial treatment plan and admission.       Independently Interpreted Test(s):   I have ordered and independently interpreted EKG Reading(s) - see prior notes  Clinical Tests:   Lab Tests: Ordered and Reviewed  Medical Tests: Ordered and Reviewed            Scribe Attestation:   Scribe #1: I performed the above scribed service and the documentation accurately describes the services I performed. I attest to the accuracy of the note.    Attending Attestation:           Physician Attestation for Scribe:  Physician Attestation Statement for Scribe #1: I, Dr. Sorenson, reviewed documentation, as scribed by Raji Hawkins in my presence, and it is both accurate and complete.                               Clinical Impression:       ICD-10-CM ICD-9-CM   1. Rectal bleeding K62.5 569.3                             Carlito Sorenson MD  11/16/19 2217

## 2019-11-16 NOTE — ED NOTES
Bed: [place] [address]  Expected date:   Expected time:   Means of arrival:   Comments:  [facility] 89 y/o w/ bloody stool x's 2 days, takes coumadin, VSS

## 2019-11-16 NOTE — H&P
[facility] Hospital Medicine  History & Physical    Patient Name: Stone Lucio  MRN: [id number]  Admission Date: 11/16/2019  Attending Physician: Amilcar Sanches MD   Primary Care Provider: Drew Castro MD         Patient information was obtained from patient, past medical records and ER records.     Subjective:     Principal Problem:Rectal bleeding    Chief Complaint:   Chief Complaint   Patient presents with    Rectal Bleeding     Transported from [facility] via [facility] w/ reports of + mohsen onset of bright red blood to rectum x 2 days. Denies fatigue, weakness or dizziness. + blood thinners        HPI: 89 y/o male with PMH HTN, HLD, prostate cancer, Atrial fibrillation on coumadin presents to ED with c/o complaint of rectal bleeding for the past week, worse today. Reports that he noticed over the last 2 weeks, blood in his undergarments and knew something was going on. States that yesterday and today he had gross blood in his stool when he had a BM and came to ED. Denies every having a similar episode. Denies HA, dizziness, SOB, CP, N/V, abdominal pain, syncope. ED evaluation H&H stable, ECG afib, INR 3.1; hemodynamically stable, placed in Observation.     Past Medical History:   Diagnosis Date    Atrial fibrillation     Cancer     Hyperlipidemia     Hypertension     Prostate cancer     on Lupron       Past Surgical History:   Procedure Laterality Date    CHOLECYSTECTOMY      INSERT / REPLACE / REMOVE PACEMAKER      JOINT REPLACEMENT      left hip ORIF      SKIN CANCER DESTRUCTION         Review of patient's allergies indicates:  No Known Allergies    No current facility-administered medications on file prior to encounter.      Current Outpatient Medications on File Prior to Encounter   Medication Sig    acetaminophen (TYLENOL) 325 MG tablet 1-2 tabs Q8h prn pain    ascorbic acid, vitamin C, (VITAMIN C) 500 mg Chew 1 at bedtime.  Chew and swallow    bicalutamide (CASODEX)  50 MG Tab Take 1 tablet (50 mg total) by mouth once daily.    calcium carbonate (OS-STACY) 500 mg calcium (1,250 mg) tablet Take 1 tablet (500 mg total) by mouth once daily. May use 1-2 tabs q 6h prn indigestion    cholecalciferol, vitamin D3, (VITAMIN D3) 1,000 unit capsule Take 1 capsule (1,000 Units total) by mouth once daily.    furosemide (LASIX) 20 MG tablet Take 1 tablet (20 mg total) by mouth once daily.    multivit with min-folic acid (ONE-A-DAY MEN VITACRAVES) 200 mcg Chew 1 daily    simvastatin (ZOCOR) 10 MG tablet Take 1 tablet (10 mg total) by mouth every evening.    docusate sodium (COLACE) 100 MG capsule 2 daily prn constipation    leuprolide acetate (LUPRON DEPOT IM) Inject into the muscle.    loratadine (CLARITIN) 10 mg tablet One every morning for runny nose    warfarin (COUMADIN) 3 MG tablet Take 1.5 tabs in the evening on , Monday, Wednesday,Thursday,Friday (Patient not taking: Reported on 2019)    warfarin (COUMADIN) 3 MG tablet Take 1.5 tablets (4.5 mg total) by mouth Daily. Current dose ( 3 mg every ; 4.5 mg all other days) or as directed by coumadin clinic. (Patient not taking: Reported on 2019)    warfarin (COUMADIN) 6 MG tablet Take 1 tab in the evening on Tuesday and Saturday. (Patient not taking: Reported on 2019)     Family History     Problem Relation (Age of Onset)    Colon cancer Sister, Sister        Tobacco Use    Smoking status: Former Smoker     Last attempt to quit: 1990     Years since quittin.6    Smokeless tobacco: Never Used   Substance and Sexual Activity    Alcohol use: Yes    Drug use: Never    Sexual activity: Not Currently     Review of Systems   Constitutional: Positive for appetite change (devreased) and fatigue. Negative for chills, diaphoresis and fever.   HENT: Negative for congestion and rhinorrhea.    Eyes: Negative.    Respiratory: Negative for cough and shortness of breath.    Cardiovascular: Negative for  chest pain and palpitations.   Gastrointestinal: Positive for blood in stool. Negative for abdominal distention, abdominal pain, constipation, diarrhea, nausea, rectal pain and vomiting.   Genitourinary: Negative for difficulty urinating and frequency.   Musculoskeletal: Negative for back pain and myalgias.   Skin: Negative for color change.   Neurological: Negative for dizziness, weakness, light-headedness and headaches.   Psychiatric/Behavioral: Negative for behavioral problems and confusion.     Objective:     Vital Signs (Most Recent):  Temp: 97.5 °F (36.4 °C) (11/16/19 1520)  Pulse: 69 (11/16/19 1520)  Resp: 18 (11/16/19 1520)  BP: (!) 155/70 (11/16/19 1520)  SpO2: 99 % (11/16/19 1520) Vital Signs (24h Range):  Temp:  [97.5 °F (36.4 °C)-97.6 °F (36.4 °C)] 97.5 °F (36.4 °C)  Pulse:  [55-69] 69  Resp:  [16-22] 18  SpO2:  [96 %-99 %] 99 %  BP: (128-155)/(63-71) 155/70     Weight: 71.4 kg (157 lb 6.5 oz)  Body mass index is 23.93 kg/m².    Physical Exam   Constitutional: He is oriented to person, place, and time. He appears well-developed and well-nourished. No distress.   Elderly male in no distress   HENT:   Head: Normocephalic and atraumatic.   Eyes: Pupils are equal, round, and reactive to light. No scleral icterus.   Neck: Normal range of motion. Neck supple.   Cardiovascular: Intact distal pulses.   Irregularly irregular   Pulmonary/Chest: Effort normal and breath sounds normal. No respiratory distress. He has no wheezes. He has no rales.   Abdominal: Soft. Bowel sounds are normal. He exhibits no distension. There is no tenderness. There is no rebound and no guarding.   Musculoskeletal: Normal range of motion. He exhibits no edema or deformity.   Neurological: He is alert and oriented to person, place, and time.   Skin: Skin is warm and dry. Capillary refill takes less than 2 seconds. He is not diaphoretic. No pallor.   Psychiatric: He has a normal mood and affect. His behavior is normal.   Nursing note and  vitals reviewed.        CRANIAL NERVES     CN III, IV, VI   Pupils are equal, round, and reactive to light.       Significant Labs:   CBC:   Recent Labs   Lab 11/16/19  1010   WBC 7.18   HGB 13.1*   HCT 40.5        CMP:   Recent Labs   Lab 11/16/19  1010      K 3.8      CO2 24   GLU 98   BUN 23   CREATININE 1.2   CALCIUM 10.2   PROT 6.6   ALBUMIN 3.8   BILITOT 0.5   ALKPHOS 86   AST 19   ALT 14   ANIONGAP 11   EGFRNONAA 50*     All pertinent labs within the past 24 hours have been reviewed.    Significant Imaging:   Imaging Results    None           Assessment/Plan:     * Rectal bleeding  - blood in stool for last 2 weeks, worsened last 2 days on AC  - possible diverticular  - monitor H&H  - BP stable, not tachycardic will hold off on IVF's for now  - clear liquids  - GI consulted      Debility  - PT/OT      Warfarin anticoagulation  - on hold      Essential hypertension  - reasonably stable  - hold home lasix  - monitor      Cardiac pacemaker  - noted      Chronic atrial fibrillation  - rate controlled  - on coumadin at home, on hold in setting of GI bleed      VTE Risk Mitigation (From admission, onward)         Ordered     Place sequential compression device  Until discontinued      11/16/19 1522     IP VTE HIGH RISK PATIENT  Once      11/16/19 1522     Reason for No Pharmacological VTE Prophylaxis  Once     Question:  Reasons:  Answer:  Active Bleeding    11/16/19 1522                   Yaima Donnelly PA-C  Department of Hospital Medicine   [facility]

## 2019-11-16 NOTE — NURSING
Pt currently continent of bowel and bladder. Moderate amount of loose stool with bright red blood noted. Pt in NAD. Will continue to monitor pt.

## 2019-11-16 NOTE — ASSESSMENT & PLAN NOTE
- blood in stool for last 2 weeks, worsened last 2 days on AC  - possible diverticular  - monitor H&H  - BP stable, not tachycardic will hold off on IVF's for now  - clear liquids  - GI consulted

## 2019-11-16 NOTE — ED NOTES
Two patient Identifiers for Stone Lucio checked and correct    Pt presents to the ED with bright red bloody stools x10 days worsening.     LOC/Affect: Pt A&Ox4. Pt affect is calm.   Appearance: Pt in no acute distress at present time. Pt is clean and well groomed.  Skin: Skin warm, dry & intact. Color consistent with ethnicity. Mucous membranes moist. No breakdown or brusing noted.   Musculoskeletal: Patient ROM intact MAEW, no obvious swelling or deformities noted. Uses walker at NH  Respiratory: Respirations spontaneous, even, and non-labored. Visible chest rise noted. Airway is open and patent. No accessory muscle use noted. Breath sounds CTA  Cardiac: All peripheral pulses present. No Bilateral lower extremity edema. Cap refill is <3 seconds. Afib on cardiac monitoring.  Neurologic: Pupils 3mm PERRL. Motor and sensation intact. Speech is clear and appropriate. Eyes open spontaneously, follows commands, facial expression symmetrical.  Abdomen: Abdomen soft, non-tender to palpation. No distention noted. ABS all quads.   : Pt reports no dysuria or hematuria.     Will continue to monitor

## 2019-11-16 NOTE — SUBJECTIVE & OBJECTIVE
Past Medical History:   Diagnosis Date    Atrial fibrillation     Cancer     Hyperlipidemia     Hypertension     Prostate cancer     on Lupron       Past Surgical History:   Procedure Laterality Date    CHOLECYSTECTOMY      INSERT / REPLACE / REMOVE PACEMAKER      JOINT REPLACEMENT      left hip ORIF      SKIN CANCER DESTRUCTION         Review of patient's allergies indicates:  No Known Allergies    No current facility-administered medications on file prior to encounter.      Current Outpatient Medications on File Prior to Encounter   Medication Sig    acetaminophen (TYLENOL) 325 MG tablet 1-2 tabs Q8h prn pain    ascorbic acid, vitamin C, (VITAMIN C) 500 mg Chew 1 at bedtime.  Chew and swallow    bicalutamide (CASODEX) 50 MG Tab Take 1 tablet (50 mg total) by mouth once daily.    calcium carbonate (OS-STACY) 500 mg calcium (1,250 mg) tablet Take 1 tablet (500 mg total) by mouth once daily. May use 1-2 tabs q 6h prn indigestion    cholecalciferol, vitamin D3, (VITAMIN D3) 1,000 unit capsule Take 1 capsule (1,000 Units total) by mouth once daily.    furosemide (LASIX) 20 MG tablet Take 1 tablet (20 mg total) by mouth once daily.    multivit with min-folic acid (ONE-A-DAY MEN VITACRAVES) 200 mcg Chew 1 daily    simvastatin (ZOCOR) 10 MG tablet Take 1 tablet (10 mg total) by mouth every evening.    docusate sodium (COLACE) 100 MG capsule 2 daily prn constipation    leuprolide acetate (LUPRON DEPOT IM) Inject into the muscle.    loratadine (CLARITIN) 10 mg tablet One every morning for runny nose    warfarin (COUMADIN) 3 MG tablet Take 1.5 tabs in the evening on Sunday, Monday, Wednesday,Thursday,Friday (Patient not taking: Reported on 11/1/2019)    warfarin (COUMADIN) 3 MG tablet Take 1.5 tablets (4.5 mg total) by mouth Daily. Current dose ( 3 mg every Wed, Fri; 4.5 mg all other days) or as directed by coumadin clinic. (Patient not taking: Reported on 11/1/2019)    warfarin (COUMADIN) 6 MG tablet  Take 1 tab in the evening on Tuesday and Saturday. (Patient not taking: Reported on 2019)     Family History     Problem Relation (Age of Onset)    Colon cancer Sister, Sister        Tobacco Use    Smoking status: Former Smoker     Last attempt to quit: 1990     Years since quittin.6    Smokeless tobacco: Never Used   Substance and Sexual Activity    Alcohol use: Yes    Drug use: Never    Sexual activity: Not Currently     Review of Systems   Constitutional: Positive for appetite change (devreased) and fatigue. Negative for chills, diaphoresis and fever.   HENT: Negative for congestion and rhinorrhea.    Eyes: Negative.    Respiratory: Negative for cough and shortness of breath.    Cardiovascular: Negative for chest pain and palpitations.   Gastrointestinal: Positive for blood in stool. Negative for abdominal distention, abdominal pain, constipation, diarrhea, nausea, rectal pain and vomiting.   Genitourinary: Negative for difficulty urinating and frequency.   Musculoskeletal: Negative for back pain and myalgias.   Skin: Negative for color change.   Neurological: Negative for dizziness, weakness, light-headedness and headaches.   Psychiatric/Behavioral: Negative for behavioral problems and confusion.     Objective:     Vital Signs (Most Recent):  Temp: 97.5 °F (36.4 °C) (19 1520)  Pulse: 69 (19 1520)  Resp: 18 (19 1520)  BP: (!) 155/70 (19 1520)  SpO2: 99 % (19 1520) Vital Signs (24h Range):  Temp:  [97.5 °F (36.4 °C)-97.6 °F (36.4 °C)] 97.5 °F (36.4 °C)  Pulse:  [55-69] 69  Resp:  [16-22] 18  SpO2:  [96 %-99 %] 99 %  BP: (128-155)/(63-71) 155/70     Weight: 71.4 kg (157 lb 6.5 oz)  Body mass index is 23.93 kg/m².    Physical Exam   Constitutional: He is oriented to person, place, and time. He appears well-developed and well-nourished. No distress.   Elderly male in no distress   HENT:   Head: Normocephalic and atraumatic.   Eyes: Pupils are equal, round, and  reactive to light. No scleral icterus.   Neck: Normal range of motion. Neck supple.   Cardiovascular: Intact distal pulses.   Irregularly irregular   Pulmonary/Chest: Effort normal and breath sounds normal. No respiratory distress. He has no wheezes. He has no rales.   Abdominal: Soft. Bowel sounds are normal. He exhibits no distension. There is no tenderness. There is no rebound and no guarding.   Musculoskeletal: Normal range of motion. He exhibits no edema or deformity.   Neurological: He is alert and oriented to person, place, and time.   Skin: Skin is warm and dry. Capillary refill takes less than 2 seconds. He is not diaphoretic. No pallor.   Psychiatric: He has a normal mood and affect. His behavior is normal.   Nursing note and vitals reviewed.        CRANIAL NERVES     CN III, IV, VI   Pupils are equal, round, and reactive to light.       Significant Labs:   CBC:   Recent Labs   Lab 11/16/19  1010   WBC 7.18   HGB 13.1*   HCT 40.5        CMP:   Recent Labs   Lab 11/16/19  1010      K 3.8      CO2 24   GLU 98   BUN 23   CREATININE 1.2   CALCIUM 10.2   PROT 6.6   ALBUMIN 3.8   BILITOT 0.5   ALKPHOS 86   AST 19   ALT 14   ANIONGAP 11   EGFRNONAA 50*     All pertinent labs within the past 24 hours have been reviewed.    Significant Imaging:   Imaging Results    None

## 2019-11-16 NOTE — HPI
Per CLIFF Pittman:  89 y/o male with PMH HTN, HLD, prostate cancer, Atrial fibrillation on coumadin presents to ED with c/o complaint of rectal bleeding for the past week, worse today. Reports that he noticed over the last 2 weeks, blood in his undergarments and knew something was going on. States that yesterday and today he had gross blood in his stool when he had a BM and came to ED. Denies every having a similar episode. Denies HA, dizziness, SOB, CP, N/V, abdominal pain, syncope. ED evaluation H&H stable, ECG afib, INR 3.1; hemodynamically stable, placed in Observation.

## 2019-11-17 LAB
BASOPHILS # BLD AUTO: 0.02 K/UL (ref 0–0.2)
BASOPHILS NFR BLD: 0.2 % (ref 0–1.9)
DIFFERENTIAL METHOD: ABNORMAL
EOSINOPHIL # BLD AUTO: 0.3 K/UL (ref 0–0.5)
EOSINOPHIL NFR BLD: 3.2 % (ref 0–8)
ERYTHROCYTE [DISTWIDTH] IN BLOOD BY AUTOMATED COUNT: 13.3 % (ref 11.5–14.5)
HCT VFR BLD AUTO: 35.8 % (ref 40–54)
HGB BLD-MCNC: 11.6 G/DL (ref 14–18)
IMM GRANULOCYTES # BLD AUTO: 0.03 K/UL (ref 0–0.04)
IMM GRANULOCYTES NFR BLD AUTO: 0.3 % (ref 0–0.5)
INR PPP: 3.5 (ref 0.8–1.2)
LYMPHOCYTES # BLD AUTO: 2.1 K/UL (ref 1–4.8)
LYMPHOCYTES NFR BLD: 24.2 % (ref 18–48)
MCH RBC QN AUTO: 30.8 PG (ref 27–31)
MCHC RBC AUTO-ENTMCNC: 32.4 G/DL (ref 32–36)
MCV RBC AUTO: 95 FL (ref 82–98)
MONOCYTES # BLD AUTO: 0.6 K/UL (ref 0.3–1)
MONOCYTES NFR BLD: 6.3 % (ref 4–15)
NEUTROPHILS # BLD AUTO: 5.8 K/UL (ref 1.8–7.7)
NEUTROPHILS NFR BLD: 65.8 % (ref 38–73)
NRBC BLD-RTO: 0 /100 WBC
PLATELET # BLD AUTO: 192 K/UL (ref 150–350)
PMV BLD AUTO: 10.3 FL (ref 9.2–12.9)
PROTHROMBIN TIME: 38.8 SEC (ref 9–12.5)
RBC # BLD AUTO: 3.77 M/UL (ref 4.6–6.2)
WBC # BLD AUTO: 8.76 K/UL (ref 3.9–12.7)

## 2019-11-17 PROCEDURE — 85610 PROTHROMBIN TIME: CPT

## 2019-11-17 PROCEDURE — 99226 PR SUBSEQUENT OBSERVATION CARE,LEVEL III: CPT | Mod: ,,, | Performed by: PHYSICIAN ASSISTANT

## 2019-11-17 PROCEDURE — C9113 INJ PANTOPRAZOLE SODIUM, VIA: HCPCS | Performed by: PHYSICIAN ASSISTANT

## 2019-11-17 PROCEDURE — 94761 N-INVAS EAR/PLS OXIMETRY MLT: CPT

## 2019-11-17 PROCEDURE — 25000003 PHARM REV CODE 250: Performed by: PHYSICIAN ASSISTANT

## 2019-11-17 PROCEDURE — G0378 HOSPITAL OBSERVATION PER HR: HCPCS

## 2019-11-17 PROCEDURE — 36415 COLL VENOUS BLD VENIPUNCTURE: CPT

## 2019-11-17 PROCEDURE — 85025 COMPLETE CBC W/AUTO DIFF WBC: CPT

## 2019-11-17 PROCEDURE — 96376 TX/PRO/DX INJ SAME DRUG ADON: CPT | Performed by: EMERGENCY MEDICINE

## 2019-11-17 PROCEDURE — 99226 PR SUBSEQUENT OBSERVATION CARE,LEVEL III: ICD-10-PCS | Mod: ,,, | Performed by: PHYSICIAN ASSISTANT

## 2019-11-17 PROCEDURE — 63600175 PHARM REV CODE 636 W HCPCS: Performed by: PHYSICIAN ASSISTANT

## 2019-11-17 RX ADMIN — BICALUTAMIDE 50 MG: 50 TABLET, FILM COATED ORAL at 09:11

## 2019-11-17 RX ADMIN — SIMVASTATIN 10 MG: 10 TABLET, FILM COATED ORAL at 09:11

## 2019-11-17 RX ADMIN — PANTOPRAZOLE SODIUM 40 MG: 40 INJECTION, POWDER, FOR SOLUTION INTRAVENOUS at 09:11

## 2019-11-17 NOTE — SUBJECTIVE & OBJECTIVE
Interval History: patient with 1 bloody BM today    Review of Systems   Constitutional: Positive for appetite change (decreased) and fatigue. Negative for chills, diaphoresis and fever.   HENT: Negative for congestion and rhinorrhea.    Eyes: Negative.    Respiratory: Negative for cough and shortness of breath.    Cardiovascular: Negative for chest pain and palpitations.   Gastrointestinal: Positive for blood in stool. Negative for abdominal distention, abdominal pain, constipation, diarrhea, nausea, rectal pain and vomiting.   Genitourinary: Negative for difficulty urinating and frequency.   Musculoskeletal: Negative for back pain and myalgias.   Skin: Negative for color change.   Neurological: Negative for dizziness, weakness, light-headedness and headaches.   Psychiatric/Behavioral: Negative for behavioral problems and confusion.     Objective:     Vital Signs (Most Recent):  Temp: 96.4 °F (35.8 °C) (11/17/19 1155)  Pulse: 67 (11/17/19 1400)  Resp: 16 (11/17/19 1155)  BP: (!) 153/70 (11/17/19 1155)  SpO2: 98 % (11/17/19 1155) Vital Signs (24h Range):  Temp:  [96.1 °F (35.6 °C)-98.6 °F (37 °C)] 96.4 °F (35.8 °C)  Pulse:  [50-96] 67  Resp:  [16-19] 16  SpO2:  [90 %-99 %] 98 %  BP: (120-173)/(63-84) 153/70     Weight: 71.4 kg (157 lb 6.5 oz)  Body mass index is 23.93 kg/m².    Intake/Output Summary (Last 24 hours) at 11/17/2019 1424  Last data filed at 11/17/2019 1345  Gross per 24 hour   Intake 754 ml   Output 600 ml   Net 154 ml      Physical Exam   Constitutional: He is oriented to person, place, and time. He appears well-developed and well-nourished. No distress.   Elderly male in no distress   HENT:   Head: Normocephalic and atraumatic.   Eyes: Pupils are equal, round, and reactive to light. No scleral icterus.   Neck: Normal range of motion. Neck supple.   Cardiovascular: Intact distal pulses.   Irregularly irregular   Pulmonary/Chest: Effort normal and breath sounds normal. No respiratory distress. He has no  wheezes.   Abdominal: Soft. Bowel sounds are normal. He exhibits no distension. There is no tenderness. There is no rebound and no guarding.   Musculoskeletal: Normal range of motion. He exhibits no edema or deformity.   Neurological: He is alert and oriented to person, place, and time.   Skin: Skin is warm and dry. Capillary refill takes less than 2 seconds. He is not diaphoretic. No pallor.   Psychiatric: He has a normal mood and affect. His behavior is normal.   Nursing note and vitals reviewed.      Significant Labs:   BMP:   Recent Labs   Lab 11/16/19  1010   GLU 98      K 3.8      CO2 24   BUN 23   CREATININE 1.2   CALCIUM 10.2     CBC:   Recent Labs   Lab 11/16/19  1010 11/17/19  0452   WBC 7.18 8.76   HGB 13.1* 11.6*   HCT 40.5 35.8*    192     All pertinent labs within the past 24 hours have been reviewed.    Significant Imaging:    Imaging Results    None

## 2019-11-17 NOTE — PLAN OF CARE
11/17/19 1151   THAYER Message   Medicare Outpatient and Observation Notification regarding financial responsibility Explained to patient/caregiver;Signed/date by patient/caregiver   Date THAYER was signed 11/17/19   Time THAYER was signed 1156

## 2019-11-17 NOTE — CONSULTS
Gastroenterology Consult    2019 9:35 AM    Patient Name: Stone Lucio  MRN: [id number]  Admission Date: 2019  Hospital Length of Stay: 0 days  Code Status: Full Code   Primary Care Physician: Drew Castro MD  Principal Problem:Rectal bleeding  Consulting Physician: Inpatient consult to Gastroenterology  Consult performed by: Marla Holguin MD  Consult ordered by: Yaima Donnelly PA-C      Reason for consultation: GI bleed    HPI:  Stone Lucio is a 89 y.o. male with a history of HTN, HLD, afib ,and prostate cancer who presented with bright red blood per rectum.  This started as small streaks of bright red blood on the toilet paper, but now he is having some larger volume maroon stool.  He denies abdominal pain other than some cramps and urgency to have a bowel movement.  No N/V, melena, or hematemesis.  He is on warfarin for afib and his INR was 3.1 on admission.  He denies any associated chest pain, shortness of breath, dizziness, weakness or syncope.  He has never had GI bleeding like this before.  His last colonoscopy was 20+ years ago.  He does not recall having a diagnosis of hemorrhoids or diverticulosis.  His mother had colon cancer, but he has never had polyps and was told at the time of his last colonoscopy that he could stop screening.    Past Medical History:   Diagnosis Date    Atrial fibrillation     Cancer     Hyperlipidemia     Hypertension     Prostate cancer     on Lupron       Past Surgical History:   Procedure Laterality Date    CHOLECYSTECTOMY      INSERT / REPLACE / REMOVE PACEMAKER      JOINT REPLACEMENT      left hip ORIF      SKIN CANCER DESTRUCTION         Social History     Tobacco Use    Smoking status: Former Smoker     Last attempt to quit: 1990     Years since quittin.6    Smokeless tobacco: Never Used   Substance Use Topics    Alcohol use: Yes    Drug use: Never        Family History   Problem Relation Age of Onset    Colon cancer Sister      "Colon cancer Sister          Review of patient's allergies indicates:  No Known Allergies      Current Facility-Administered Medications:     acetaminophen tablet 325 mg, 325 mg, Oral, Q6H PRN, Yaima Donnelly PA-C    bicalutamide tablet 50 mg, 50 mg, Oral, Daily, Yaima Donnelly PA-C    ondansetron injection 4 mg, 4 mg, Intravenous, Q6H PRN, Yaima Donnelly PA-C    pantoprazole injection 40 mg, 40 mg, Intravenous, Daily, Yaima Donnelly PA-C, 40 mg at 11/17/19 0934    simvastatin tablet 10 mg, 10 mg, Oral, QHS, Yaima Donnelly PA-C, 10 mg at 11/16/19 2000    sodium chloride 0.9% flush 10 mL, 10 mL, Intravenous, PRN, Carlito Sorenson MD    Review of Systems   Constitutional: Negative for chills, fever and weight loss.   HENT: Negative.    Eyes: Negative.    Respiratory: Negative.    Cardiovascular: Negative.    Gastrointestinal: Positive for blood in stool. Negative for abdominal pain, constipation, diarrhea, heartburn, melena, nausea and vomiting.   Genitourinary: Negative.    Musculoskeletal: Negative.    Skin: Negative.    Neurological: Negative.    Endo/Heme/Allergies: Negative.        BP (!) 157/76 (BP Location: Right arm, Patient Position: Lying)   Pulse 60   Temp 97.5 °F (36.4 °C) (Oral)   Resp 18   Ht 5' 8" (1.727 m)   Wt 71.4 kg (157 lb 6.5 oz)   SpO2 99%   BMI 23.93 kg/m²   Physical Exam   Constitutional: He is oriented to person, place, and time. He appears well-developed and well-nourished. No distress.   HENT:   Head: Normocephalic and atraumatic.   Mouth/Throat: Oropharynx is clear and moist.   Eyes: Pupils are equal, round, and reactive to light. EOM are normal. No scleral icterus.   Cardiovascular: Normal rate and normal heart sounds.   No murmur heard.  irregular   Pulmonary/Chest: Effort normal and breath sounds normal. No respiratory distress.   Abdominal: Soft. Bowel sounds are normal. He exhibits no distension. There is no tenderness. There is no rebound and no guarding. "   Musculoskeletal: Normal range of motion. He exhibits no edema.   Neurological: He is alert and oriented to person, place, and time.   Skin: Skin is warm and dry. No rash noted.   Vitals reviewed.      Labs:  Lab Results   Component Value Date/Time    WBC 8.76 11/17/2019 04:52 AM    HGB 11.6 (L) 11/17/2019 04:52 AM    HCT 35.8 (L) 11/17/2019 04:52 AM     11/17/2019 04:52 AM    MCV 95 11/17/2019 04:52 AM     11/16/2019 10:10 AM    K 3.8 11/16/2019 10:10 AM     11/16/2019 10:10 AM    CO2 24 11/16/2019 10:10 AM    BUN 23 11/16/2019 10:10 AM    CREATININE 1.2 11/16/2019 10:10 AM    GLU 98 11/16/2019 10:10 AM    CALCIUM 10.2 11/16/2019 10:10 AM    INR 3.5 (H) 11/17/2019 04:52 AM   ]  Lab Results   Component Value Date/Time    PROT 6.6 11/16/2019 10:10 AM    ALBUMIN 3.8 11/16/2019 10:10 AM    BILITOT 0.5 11/16/2019 10:10 AM    AST 19 11/16/2019 10:10 AM    ALT 14 11/16/2019 10:10 AM    ALKPHOS 86 11/16/2019 10:10 AM   ]    Imaging and Procedures:  I personally reviewed the imaging/procedures below.  None    Assessment:  Stone Lucio is a 89 y.o. male with a history of HTN, HLD, afib ,and prostate cancer who presented with bright red blood per rectum. He has been hemodynamically stable, but H/H did drop.     Plan:  - IV fluids  - serial H/H  - observe for now - suspect diverticular bleeding vs. Hemorrhoids, but wouldn't be unreasonable to do colonoscopy/flex sig to confirm diagnosis since he does have a family history of colon cancer in his mother - discussed the risks of anesthesia and procedural complications   - no need to correct INR unless he develops hemodynamically significant bleeding  - okay with full liquid diet for now    Marla Holguin MD

## 2019-11-17 NOTE — PLAN OF CARE
SW met with patient at bedside to complete discharge planning assessment. Patient is alert and oriented.  Patient verified all demographic information on facesheet is correct.  Patient verified PCP is Dr. Drew Castro.  Patient verified primary health insurance is Medicare.  Patient with no home health and rollator and wheelchair for dme.  Patient lives alone, states he requires minimum assistance and wife drives.  Patient is not on coumadin or dialysis. Patient with no [facility] [place] but has POA (Rusty Lucio, son).  Patient uses [facility] - [address]       11/17/19 1155   Discharge Assessment   Assessment Type Discharge Planning Assessment   Confirmed/corrected address and phone number on facesheet? Yes   Assessment information obtained from? Patient   Prior to hospitilization cognitive status: Alert/Oriented   Prior to hospitalization functional status: Independent   Current cognitive status: Alert/Oriented   Current Functional Status: Independent   Lives With alone   Able to Return to Prior Arrangements yes   Is patient able to care for self after discharge? Yes  (with assistance)   Patient's perception of discharge disposition home or selfcare   Readmission Within the Last 30 Days no previous admission in last 30 days   Patient currently being followed by outpatient case management? No   Patient currently receives any other outside agency services? No   Equipment Currently Used at Home rollator;wheelchair   Do you have any problems affording any of your prescribed medications? No   Does the patient have transportation home? Yes   Transportation Anticipated family or friend will provide   Does the patient receive services at the [facility]? No   Discharge Plan A Home   DME Needed Upon Discharge  none   Patient/Family in Agreement with Plan yes

## 2019-11-17 NOTE — PLAN OF CARE
Plan of care reviewed with pt. Pt AAOx4, NAD. Pt denies pain, SOB, n/v, dizziness or lightheadedness. VS stable. Pt remains afebrile. Pt had 1 episode of loose stool with blood. Pt tolerating clear liquid diet. Pt remains free from falls or injury. Bed alarm set. Safety measures implemented. Bed is lowered and locked. Call light is within reach. Will continue to monitor pt.

## 2019-11-17 NOTE — ASSESSMENT & PLAN NOTE
- blood in stool for last 2 weeks, worsened last 2 days on AC  - possible diverticular  - monitor H&H, dropped today, had 1 bloody BM today  - BP stable  - full for now and observe  - d/w Dr. Holguin

## 2019-11-17 NOTE — PROGRESS NOTES
[facility] Hospital Medicine  Progress Note    Patient Name: Stone Lucio  MRN: [id number]  Patient Class: OP- Observation   Admission Date: 11/16/2019  Length of Stay: 0 days  Attending Physician: Amilcar Sanches MD  Primary Care Provider: Drew Castro MD        Subjective:     Principal Problem:Rectal bleeding        HPI:  89 y/o male with PMH HTN, HLD, prostate cancer, Atrial fibrillation on coumadin presents to ED with c/o complaint of rectal bleeding for the past week, worse today. Reports that he noticed over the last 2 weeks, blood in his undergarments and knew something was going on. States that yesterday and today he had gross blood in his stool when he had a BM and came to ED. Denies every having a similar episode. Denies HA, dizziness, SOB, CP, N/V, abdominal pain, syncope. ED evaluation H&H stable, ECG afib, INR 3.1; hemodynamically stable, placed in Observation.     Overview/Hospital Course:  89 y.o. male with a history of HTN, HLD, afib ,and prostate cancer who presented with bright red blood per rectum.     Interval History: patient with 1 bloody BM today    Review of Systems   Constitutional: Positive for appetite change (decreased) and fatigue. Negative for chills, diaphoresis and fever.   HENT: Negative for congestion and rhinorrhea.    Eyes: Negative.    Respiratory: Negative for cough and shortness of breath.    Cardiovascular: Negative for chest pain and palpitations.   Gastrointestinal: Positive for blood in stool. Negative for abdominal distention, abdominal pain, constipation, diarrhea, nausea, rectal pain and vomiting.   Genitourinary: Negative for difficulty urinating and frequency.   Musculoskeletal: Negative for back pain and myalgias.   Skin: Negative for color change.   Neurological: Negative for dizziness, weakness, light-headedness and headaches.   Psychiatric/Behavioral: Negative for behavioral problems and confusion.     Objective:     Vital Signs (Most  Recent):  Temp: 96.4 °F (35.8 °C) (11/17/19 1155)  Pulse: 67 (11/17/19 1400)  Resp: 16 (11/17/19 1155)  BP: (!) 153/70 (11/17/19 1155)  SpO2: 98 % (11/17/19 1155) Vital Signs (24h Range):  Temp:  [96.1 °F (35.6 °C)-98.6 °F (37 °C)] 96.4 °F (35.8 °C)  Pulse:  [50-96] 67  Resp:  [16-19] 16  SpO2:  [90 %-99 %] 98 %  BP: (120-173)/(63-84) 153/70     Weight: 71.4 kg (157 lb 6.5 oz)  Body mass index is 23.93 kg/m².    Intake/Output Summary (Last 24 hours) at 11/17/2019 1424  Last data filed at 11/17/2019 1345  Gross per 24 hour   Intake 754 ml   Output 600 ml   Net 154 ml      Physical Exam   Constitutional: He is oriented to person, place, and time. He appears well-developed and well-nourished. No distress.   Elderly male in no distress   HENT:   Head: Normocephalic and atraumatic.   Eyes: Pupils are equal, round, and reactive to light. No scleral icterus.   Neck: Normal range of motion. Neck supple.   Cardiovascular: Intact distal pulses.   Irregularly irregular   Pulmonary/Chest: Effort normal and breath sounds normal. No respiratory distress. He has no wheezes.   Abdominal: Soft. Bowel sounds are normal. He exhibits no distension. There is no tenderness. There is no rebound and no guarding.   Musculoskeletal: Normal range of motion. He exhibits no edema or deformity.   Neurological: He is alert and oriented to person, place, and time.   Skin: Skin is warm and dry. Capillary refill takes less than 2 seconds. He is not diaphoretic. No pallor.   Psychiatric: He has a normal mood and affect. His behavior is normal.   Nursing note and vitals reviewed.      Significant Labs:   BMP:   Recent Labs   Lab 11/16/19  1010   GLU 98      K 3.8      CO2 24   BUN 23   CREATININE 1.2   CALCIUM 10.2     CBC:   Recent Labs   Lab 11/16/19  1010 11/17/19  0452   WBC 7.18 8.76   HGB 13.1* 11.6*   HCT 40.5 35.8*    192     All pertinent labs within the past 24 hours have been reviewed.    Significant Imaging:    Imaging  Results    None             Assessment/Plan:      * Rectal bleeding  - blood in stool for last 2 weeks, worsened last 2 days on AC  - possible diverticular  - monitor H&H, dropped today, had 1 bloody BM today  - BP stable  - full for now and observe  - d/w Dr. Holguin      Debility  - PT/OT      Warfarin anticoagulation  - on hold      Essential hypertension  - reasonably stable  - hold home lasix  - monitor      Cardiac pacemaker  - noted      Chronic atrial fibrillation  - rate controlled  - on coumadin at home, on hold in setting of GI bleed      VTE Risk Mitigation (From admission, onward)         Ordered     Place sequential compression device  Until discontinued      11/16/19 1522     IP VTE HIGH RISK PATIENT  Once      11/16/19 1522     Reason for No Pharmacological VTE Prophylaxis  Once     Question:  Reasons:  Answer:  Active Bleeding    11/16/19 1522                      Yaima Donnelly PA-C  Department of Hospital Medicine   [facility]

## 2019-11-18 LAB
ANION GAP SERPL CALC-SCNC: 10 MMOL/L (ref 8–16)
BASOPHILS # BLD AUTO: 0.03 K/UL (ref 0–0.2)
BASOPHILS NFR BLD: 0.4 % (ref 0–1.9)
BUN SERPL-MCNC: 18 MG/DL (ref 10–30)
CALCIUM SERPL-MCNC: 9.7 MG/DL (ref 8.7–10.5)
CHLORIDE SERPL-SCNC: 110 MMOL/L (ref 95–110)
CO2 SERPL-SCNC: 21 MMOL/L (ref 23–29)
CREAT SERPL-MCNC: 1 MG/DL (ref 0.5–1.4)
DIFFERENTIAL METHOD: ABNORMAL
EOSINOPHIL # BLD AUTO: 0.3 K/UL (ref 0–0.5)
EOSINOPHIL NFR BLD: 3.8 % (ref 0–8)
ERYTHROCYTE [DISTWIDTH] IN BLOOD BY AUTOMATED COUNT: 13.3 % (ref 11.5–14.5)
EST. GFR  (AFRICAN AMERICAN): >60 ML/MIN/1.73 M^2
EST. GFR  (NON AFRICAN AMERICAN): >60 ML/MIN/1.73 M^2
GLUCOSE SERPL-MCNC: 87 MG/DL (ref 70–110)
HCT VFR BLD AUTO: 35.1 % (ref 40–54)
HGB BLD-MCNC: 11.1 G/DL (ref 14–18)
IMM GRANULOCYTES # BLD AUTO: 0.02 K/UL (ref 0–0.04)
IMM GRANULOCYTES NFR BLD AUTO: 0.3 % (ref 0–0.5)
INR PPP: 3.5 (ref 0.8–1.2)
LYMPHOCYTES # BLD AUTO: 2.2 K/UL (ref 1–4.8)
LYMPHOCYTES NFR BLD: 27.7 % (ref 18–48)
MCH RBC QN AUTO: 30.3 PG (ref 27–31)
MCHC RBC AUTO-ENTMCNC: 31.6 G/DL (ref 32–36)
MCV RBC AUTO: 96 FL (ref 82–98)
MONOCYTES # BLD AUTO: 0.6 K/UL (ref 0.3–1)
MONOCYTES NFR BLD: 7.4 % (ref 4–15)
NEUTROPHILS # BLD AUTO: 4.8 K/UL (ref 1.8–7.7)
NEUTROPHILS NFR BLD: 60.4 % (ref 38–73)
NRBC BLD-RTO: 0 /100 WBC
PLATELET # BLD AUTO: 185 K/UL (ref 150–350)
PMV BLD AUTO: 10.2 FL (ref 9.2–12.9)
POTASSIUM SERPL-SCNC: 4.4 MMOL/L (ref 3.5–5.1)
PROTHROMBIN TIME: 38.4 SEC (ref 9–12.5)
RBC # BLD AUTO: 3.66 M/UL (ref 4.6–6.2)
SODIUM SERPL-SCNC: 141 MMOL/L (ref 136–145)
WBC # BLD AUTO: 7.99 K/UL (ref 3.9–12.7)

## 2019-11-18 PROCEDURE — 97116 GAIT TRAINING THERAPY: CPT

## 2019-11-18 PROCEDURE — 85610 PROTHROMBIN TIME: CPT

## 2019-11-18 PROCEDURE — G0378 HOSPITAL OBSERVATION PER HR: HCPCS

## 2019-11-18 PROCEDURE — 99226 PR SUBSEQUENT OBSERVATION CARE,LEVEL III: ICD-10-PCS | Mod: ,,, | Performed by: PHYSICIAN ASSISTANT

## 2019-11-18 PROCEDURE — 99226 PR SUBSEQUENT OBSERVATION CARE,LEVEL III: CPT | Mod: ,,, | Performed by: PHYSICIAN ASSISTANT

## 2019-11-18 PROCEDURE — 25000003 PHARM REV CODE 250: Performed by: PHYSICIAN ASSISTANT

## 2019-11-18 PROCEDURE — 80048 BASIC METABOLIC PNL TOTAL CA: CPT

## 2019-11-18 PROCEDURE — 94761 N-INVAS EAR/PLS OXIMETRY MLT: CPT

## 2019-11-18 PROCEDURE — 36415 COLL VENOUS BLD VENIPUNCTURE: CPT

## 2019-11-18 PROCEDURE — 97161 PT EVAL LOW COMPLEX 20 MIN: CPT

## 2019-11-18 PROCEDURE — 85025 COMPLETE CBC W/AUTO DIFF WBC: CPT

## 2019-11-18 RX ORDER — PANTOPRAZOLE SODIUM 40 MG/1
40 TABLET, DELAYED RELEASE ORAL DAILY
Status: DISCONTINUED | OUTPATIENT
Start: 2019-11-18 | End: 2019-11-22 | Stop reason: HOSPADM

## 2019-11-18 RX ADMIN — PANTOPRAZOLE SODIUM 40 MG: 40 TABLET, DELAYED RELEASE ORAL at 09:11

## 2019-11-18 RX ADMIN — BICALUTAMIDE 50 MG: 50 TABLET, FILM COATED ORAL at 09:11

## 2019-11-18 RX ADMIN — SIMVASTATIN 10 MG: 10 TABLET, FILM COATED ORAL at 08:11

## 2019-11-18 NOTE — SUBJECTIVE & OBJECTIVE
Interval History: 1 black liquid stool today    Review of Systems   Constitutional: Positive for appetite change (decreased) and fatigue. Negative for chills, diaphoresis and fever.   HENT: Negative for congestion and rhinorrhea.    Eyes: Negative.    Respiratory: Negative for cough and shortness of breath.    Cardiovascular: Negative for chest pain and palpitations.   Gastrointestinal: Positive for blood in stool. Negative for abdominal distention, abdominal pain, constipation, diarrhea, nausea, rectal pain and vomiting.   Genitourinary: Negative for difficulty urinating and frequency.   Musculoskeletal: Negative for back pain and myalgias.   Skin: Negative for color change.   Neurological: Negative for dizziness, weakness, light-headedness and headaches.   Psychiatric/Behavioral: Negative for behavioral problems and confusion.     Objective:     Vital Signs (Most Recent):  Temp: 97.7 °F (36.5 °C) (11/18/19 1136)  Pulse: 65 (11/18/19 1200)  Resp: 16 (11/18/19 1136)  BP: (!) 119/59 (11/18/19 1136)  SpO2: 95 % (11/18/19 1136) Vital Signs (24h Range):  Temp:  [96.4 °F (35.8 °C)-97.9 °F (36.6 °C)] 97.7 °F (36.5 °C)  Pulse:  [56-86] 65  Resp:  [16-20] 16  SpO2:  [95 %-98 %] 95 %  BP: (119-170)/(59-72) 119/59     Weight: 71.4 kg (157 lb 6.5 oz)  Body mass index is 23.93 kg/m².    Intake/Output Summary (Last 24 hours) at 11/18/2019 1355  Last data filed at 11/18/2019 1000  Gross per 24 hour   Intake 597 ml   Output 1025 ml   Net -428 ml      Physical Exam   Constitutional: He is oriented to person, place, and time. He appears well-developed and well-nourished. No distress.   Elderly male in no distress   HENT:   Head: Normocephalic and atraumatic.   Eyes: Pupils are equal, round, and reactive to light. No scleral icterus.   Neck: Normal range of motion. Neck supple.   Cardiovascular: Intact distal pulses.   Irregularly irregular   Pulmonary/Chest: Effort normal and breath sounds normal. No respiratory distress. He has no  wheezes.   Abdominal: Soft. Bowel sounds are normal. He exhibits no distension. There is no tenderness. There is no rebound and no guarding.   Musculoskeletal: Normal range of motion. He exhibits no edema or deformity.   Neurological: He is alert and oriented to person, place, and time.   Skin: Skin is warm and dry. Capillary refill takes less than 2 seconds. He is not diaphoretic. No pallor.   Psychiatric: He has a normal mood and affect.   Nursing note and vitals reviewed.      Significant Labs:   CBC:   Recent Labs   Lab 11/17/19  0452 11/18/19  0429   WBC 8.76 7.99   HGB 11.6* 11.1*   HCT 35.8* 35.1*    185     CMP:   Recent Labs   Lab 11/18/19  0429      K 4.4      CO2 21*   GLU 87   BUN 18   CREATININE 1.0   CALCIUM 9.7   ANIONGAP 10   EGFRNONAA >60     All pertinent labs within the past 24 hours have been reviewed.    Significant Imaging:   Imaging Results    None

## 2019-11-18 NOTE — PROGRESS NOTES
[facility] Hospital Medicine  Progress Note    Patient Name: Stone Lucio  MRN: [id number]  Patient Class: OP- Observation   Admission Date: 11/16/2019  Length of Stay: 0 days  Attending Physician: Amilcar Sanches MD  Primary Care Provider: Drew Castro MD        Subjective:     Principal Problem:Rectal bleeding        HPI:  89 y/o male with PMH HTN, HLD, prostate cancer, Atrial fibrillation on coumadin presents to ED with c/o complaint of rectal bleeding for the past week, worse today. Reports that he noticed over the last 2 weeks, blood in his undergarments and knew something was going on. States that yesterday and today he had gross blood in his stool when he had a BM and came to ED. Denies every having a similar episode. Denies HA, dizziness, SOB, CP, N/V, abdominal pain, syncope. ED evaluation H&H stable, ECG afib, INR 3.1; hemodynamically stable, placed in Observation.     Overview/Hospital Course:  89 y.o. male with a history of HTN, HLD, afib ,and prostate cancer who presented with bright red blood per rectum.     Interval History: 1 black liquid stool today    Review of Systems   Constitutional: Positive for appetite change (decreased) and fatigue. Negative for chills, diaphoresis and fever.   HENT: Negative for congestion and rhinorrhea.    Eyes: Negative.    Respiratory: Negative for cough and shortness of breath.    Cardiovascular: Negative for chest pain and palpitations.   Gastrointestinal: Positive for blood in stool. Negative for abdominal distention, abdominal pain, constipation, diarrhea, nausea, rectal pain and vomiting.   Genitourinary: Negative for difficulty urinating and frequency.   Musculoskeletal: Negative for back pain and myalgias.   Skin: Negative for color change.   Neurological: Negative for dizziness, weakness, light-headedness and headaches.   Psychiatric/Behavioral: Negative for behavioral problems and confusion.     Objective:     Vital Signs (Most  Recent):  Temp: 97.7 °F (36.5 °C) (11/18/19 1136)  Pulse: 65 (11/18/19 1200)  Resp: 16 (11/18/19 1136)  BP: (!) 119/59 (11/18/19 1136)  SpO2: 95 % (11/18/19 1136) Vital Signs (24h Range):  Temp:  [96.4 °F (35.8 °C)-97.9 °F (36.6 °C)] 97.7 °F (36.5 °C)  Pulse:  [56-86] 65  Resp:  [16-20] 16  SpO2:  [95 %-98 %] 95 %  BP: (119-170)/(59-72) 119/59     Weight: 71.4 kg (157 lb 6.5 oz)  Body mass index is 23.93 kg/m².    Intake/Output Summary (Last 24 hours) at 11/18/2019 1355  Last data filed at 11/18/2019 1000  Gross per 24 hour   Intake 597 ml   Output 1025 ml   Net -428 ml      Physical Exam   Constitutional: He is oriented to person, place, and time. He appears well-developed and well-nourished. No distress.   Elderly male in no distress   HENT:   Head: Normocephalic and atraumatic.   Eyes: Pupils are equal, round, and reactive to light. No scleral icterus.   Neck: Normal range of motion. Neck supple.   Cardiovascular: Intact distal pulses.   Irregularly irregular   Pulmonary/Chest: Effort normal and breath sounds normal. No respiratory distress. He has no wheezes.   Abdominal: Soft. Bowel sounds are normal. He exhibits no distension. There is no tenderness. There is no rebound and no guarding.   Musculoskeletal: Normal range of motion. He exhibits no edema or deformity.   Neurological: He is alert and oriented to person, place, and time.   Skin: Skin is warm and dry. Capillary refill takes less than 2 seconds. He is not diaphoretic. No pallor.   Psychiatric: He has a normal mood and affect.   Nursing note and vitals reviewed.      Significant Labs:   CBC:   Recent Labs   Lab 11/17/19  0452 11/18/19  0429   WBC 8.76 7.99   HGB 11.6* 11.1*   HCT 35.8* 35.1*    185     CMP:   Recent Labs   Lab 11/18/19  0429      K 4.4      CO2 21*   GLU 87   BUN 18   CREATININE 1.0   CALCIUM 9.7   ANIONGAP 10   EGFRNONAA >60     All pertinent labs within the past 24 hours have been reviewed.    Significant Imaging:    Imaging Results    None             Assessment/Plan:      * Rectal bleeding  - blood in stool for last 2 weeks, worsened last 2 days on AC  - possible diverticular  - monitor H&H, dropped today, had 1 bloody BM today  - BP stable  - adv diet  - observe, no plan as of now for endoscopic procedures  - d/w Dr. Gutierrez      Debility  - PT/OT      Warfarin anticoagulation  - on hold      Essential hypertension  - reasonably stable  - hold home lasix  - monitor      Cardiac pacemaker  - noted      Chronic atrial fibrillation  - rate controlled  - on coumadin at home, on hold in setting of GI bleed      VTE Risk Mitigation (From admission, onward)         Ordered     Place sequential compression device  Until discontinued      11/16/19 1522     IP VTE HIGH RISK PATIENT  Once      11/16/19 1522     Reason for No Pharmacological VTE Prophylaxis  Once     Question:  Reasons:  Answer:  Active Bleeding    11/16/19 1522                      Yaima Donnelly PA-C  Department of Hospital Medicine   [facility]

## 2019-11-18 NOTE — PLAN OF CARE
Pt presented supine, agreeable ot PT.  Supine>sit with mod I/use of rails, sit >stand with SBA to rollator. Gait x 200ft  with rollator with SBA,  flexed trunk, and head down, pt with demonstrated improved posture with instruction. Pt exhibits decrease stability in gait after ~150ft .  Pt transfer to [facility] with [facility]

## 2019-11-18 NOTE — PLAN OF CARE
CM met with patient at the bedside and notified him that PT recommended that he have HH when leaves the hospital. Patient is agreeable with discharge plan and was provided with a list of HH agencies. He refers to stay [facility]. Referral sent to [facility].

## 2019-11-18 NOTE — PLAN OF CARE
Patient awake and alert. Respirations are even and unlabored. No events on cardiac monitor. Afib, rate controlled 50-80s. Patient intermittently had an episode of confusion, attempted to get out of bed unassisted. Patient was redirected easily, responded appropriately to neuro commands. No BM overnight. Voids per urinal. Poor appetite remains. Skin intact. Repositions self.     Fall precautions in place. Pt moved closer to nurses station for safety.

## 2019-11-18 NOTE — ASSESSMENT & PLAN NOTE
- blood in stool for last 2 weeks, worsened last 2 days on AC  - possible diverticular  - monitor H&H, dropped today, had 1 bloody BM today  - BP stable  - adv diet  - observe, no plan as of now for endoscopic procedures  - d/w Dr. Gutierrez

## 2019-11-18 NOTE — PT/OT/SLP EVAL
Physical Therapy Evaluation    Patient Name:  Stone Lucio   MRN:  [id number]    Recommendations:     Discharge Recommendations:  home health PT   Discharge Equipment Recommendations: none   Barriers to discharge: None    Assessment:     Stone Lucio is a 89 y.o. male admitted with a medical diagnosis of Rectal bleeding.  He presents with the following impairments/functional limitations:  weakness, impaired endurance, impaired functional mobilty, gait instability, impaired balance, decreased coordination, decreased lower extremity function, impaired coordination .Pt  with functional mobility deficits and should benefit from  PT  to maximize I and safety decrease risk of further decline or injury.Pt would benefit from  PT to ensure safety and decrease risk of readmission.    Rehab Prognosis: Good; patient would benefit from acute skilled PT services to address these deficits and reach maximum level of function.    Recent Surgery: * No surgery found *      Plan:     During this hospitalization, patient to be seen 5 x/week to address the identified rehab impairments via gait training, therapeutic activities, therapeutic exercises and progress toward the following goals:    · Plan of Care Expires:  11/30/19    Subjective     Chief Complaint: pt without significant c/o  Patient/Family Comments/goals: pt expressing concern about his recent experience with bloody bowel movement  Pain/Comfort:  · Pain Rating 1: 0/10    Patients cultural, spiritual, Sabianism conflicts given the current situation: no    Living Environment:  Pt lives in [facility] assisted living  Prior to admission, patients level of function was mod I at facility level in mobility, received assistance for am/pm dressing and bathing.  Equipment used at home: walker, rolling, wheelchair, hospital bed.  DME owned (not currently used): none.  Upon discharge, patient will have assistance from facility staff.    Objective:     Communicated with Yaima walters  to session. Dicussed pt's high Protime and INR, pt was cleared for PT.  Patient found supine with peripheral IV  upon PT entry to room.    General Precautions: Standard, fall   Orthopedic Precautions:N/A   Braces: N/A     Exams:  · Cognitive Exam:  Patient is oriented to Person, Place, Time and Situation  · Gross Motor Coordination:  WFL  · Postural Exam:  Patient presented with the following abnormalities:    · -       Rounded shoulders  · -       Forward head  · -       Kyphosis  · Sensation: -       Intact  · Skin Integrity/Edema:    Visible skin intact  · RUE ROM: WNL  · RUE Strength: WFL  · LUE ROM: WNL  · LUE Strength: WFL  · RLE ROM: WNL  · RLE Strength: WFL  · LLE ROM: WNL  · LLE Strength: WFL    Functional Mobility:  · Bed Mobility:     · Supine to Sit: modified independence  · Transfers:     · Sit to Stand:  stand by assistance with rollator  · Bed to Chair: stand by assistance with  rollator  using  Step Transfer        Gait x 200ft  with rollator with SBA,  flexed trunk, and head down, pt with demonstrated improved posture with instruction. Pt exhibits decrease stability in gait after ~150ft       Therapeutic Activities and Exercises:   Pt presented supine, agreeable ot PT.  Supine>sit with mod I/use of rails, sit >stand with SBA to rollator. Gait x 200ft  with rollator with SBA,  flexed trunk, and head down, pt with demonstrated improved posture with instruction. Pt exhibits decrease stability in gait after ~150ft .  Pt transfer to [facility] with [facility]    AM-PAC 6 CLICK MOBILITY  Total Score:20     Patient left up in chair with all lines intact, call button in reach and Tanyelle, nurse notified.    GOALS:   Multidisciplinary Problems     Physical Therapy Goals        Problem: Physical Therapy Goal    Goal Priority Disciplines Outcome Goal Variances Interventions   Physical Therapy Goal     PT, PT/OT Ongoing, Progressing     Description:  Patient will increase functional independence with mobility by  performin. Sit<>stand with SPV with rollator.  2. Gait x 150 feet with SPV with rollator.                    History:     Past Medical History:   Diagnosis Date    Atrial fibrillation     Cancer     Hyperlipidemia     Hypertension     Prostate cancer     on Lupron       Past Surgical History:   Procedure Laterality Date    CHOLECYSTECTOMY      INSERT / REPLACE / REMOVE PACEMAKER      JOINT REPLACEMENT      left hip ORIF      SKIN CANCER DESTRUCTION         Time Tracking:     PT Received On: 19  PT Start Time: 58     PT Stop Time: 1025  PT Total Time (min): 27 min     Billable Minutes: Evaluation 17 and Gait Training 10      Hill Garrett, PT  2019

## 2019-11-18 NOTE — PROGRESS NOTES
Gastroenterology Progress Note    Active Hospital Problems    *Rectal bleeding      Debility      Chronic atrial fibrillation      Cardiac pacemaker      Essential hypertension      Warfarin anticoagulation        Subjective:  Patient seen and examined.  The patient is stable this AM.  No overt GI blood loss.  Hb stable.      Reviews of Systems:  General:  Negative for fever or chills  Cardiovascular:  Negative for chest pain, shortness of breath, palpitations    Physical Exam    Vitals:  Vital Signs (Most Recent):  Temp: 97.7 °F (36.5 °C) (11/18/19 0853)  Pulse: 63 (11/18/19 0853)  Resp: 18 (11/18/19 0853)  BP: (!) 170/70 (11/18/19 0853)  SpO2: 98 % (11/18/19 0853) Vital Signs (24h Range):  Temp:  [96.4 °F (35.8 °C)-97.9 °F (36.6 °C)] 97.7 °F (36.5 °C)  Pulse:  [56-86] 63  Resp:  [16-20] 18  SpO2:  [96 %-98 %] 98 %  BP: (142-170)/(66-72) 170/70     GEN: Well developed, well nourished in no apparent distress   HENT: Normocephalic, anicteric sclera   Cardiovascular: Regular rate and rhythm. No murmurs appreciated.   Chest: Non-labored respirations. Breath sounds equal   Abdomen: soft, NTND, no masses  Psych: Appropriate mood and affect.   Extermities: No C/C/E. 2+ dorsalis pedis pulses bilaterally  Skin: No new visible or palpable lesions.      Medications/Infusions:  Current Facility-Administered Medications   Medication Dose Route Frequency Provider Last Rate Last Dose    acetaminophen tablet 325 mg  325 mg Oral Q6H PRN Yaima Donnelly PA-C        bicalutamide tablet 50 mg  50 mg Oral Daily Yaima Donnelly PA-C   50 mg at 11/18/19 0939    ondansetron injection 4 mg  4 mg Intravenous Q6H PRN Yaima Donnelly PA-C        pantoprazole EC tablet 40 mg  40 mg Oral Daily Yaima Donnelly PA-C   40 mg at 11/18/19 0939    simvastatin tablet 10 mg  10 mg Oral QHS Yaima Donnelly PA-C   10 mg at 11/17/19 2120    sodium chloride 0.9% flush 10 mL  10 mL Intravenous PRN Carlito Sorenson MD           Intake and  Output:    Intake/Output Summary (Last 24 hours) at 11/18/2019 0954  Last data filed at 11/18/2019 0500  Gross per 24 hour   Intake 240 ml   Output 1125 ml   Net -885 ml       Labs:    Results for STONE SIMON (MRN [id number]) as of 11/18/2019 09:55   Ref. Range 11/18/2019 04:29   Hemoglobin Latest Ref Range: 14.0 - 18.0 g/dL 11.1 (L)   Hematocrit Latest Ref Range: 40.0 - 54.0 % 35.1 (L)       Imaging and other studies:    No new    Assessment:  Stone Simon is a 89 y.o. male with a history of HTN, HLD, afib ,and prostate cancer who presented with bright red blood per rectum. He has been hemodynamically stable, Hb stable overnight.    Plan:    1. OK to advance diet  2.  No endoscopic plans at this time given clinical stability  3.  Continue to hold coumadin, INR remains elevated    Discussed with CLIFF Shirley

## 2019-11-19 PROBLEM — Z85.46 HISTORY OF PROSTATE CANCER: Status: ACTIVE | Noted: 2019-11-19

## 2019-11-19 LAB
HCT VFR BLD AUTO: 33.2 % (ref 40–54)
HGB BLD-MCNC: 10.6 G/DL (ref 14–18)
INR PPP: 2.5 (ref 0.8–1.2)
PROTHROMBIN TIME: 28.1 SEC (ref 9–12.5)

## 2019-11-19 PROCEDURE — 97116 GAIT TRAINING THERAPY: CPT

## 2019-11-19 PROCEDURE — 99233 SBSQ HOSP IP/OBS HIGH 50: CPT | Mod: ,,, | Performed by: NURSE PRACTITIONER

## 2019-11-19 PROCEDURE — 97110 THERAPEUTIC EXERCISES: CPT

## 2019-11-19 PROCEDURE — 85018 HEMOGLOBIN: CPT

## 2019-11-19 PROCEDURE — 11000001 HC ACUTE MED/SURG PRIVATE ROOM

## 2019-11-19 PROCEDURE — 85610 PROTHROMBIN TIME: CPT

## 2019-11-19 PROCEDURE — 25000003 PHARM REV CODE 250: Performed by: PHYSICIAN ASSISTANT

## 2019-11-19 PROCEDURE — 36415 COLL VENOUS BLD VENIPUNCTURE: CPT

## 2019-11-19 PROCEDURE — 85014 HEMATOCRIT: CPT

## 2019-11-19 PROCEDURE — 94761 N-INVAS EAR/PLS OXIMETRY MLT: CPT

## 2019-11-19 PROCEDURE — 99233 PR SUBSEQUENT HOSPITAL CARE,LEVL III: ICD-10-PCS | Mod: ,,, | Performed by: NURSE PRACTITIONER

## 2019-11-19 RX ADMIN — SIMVASTATIN 10 MG: 10 TABLET, FILM COATED ORAL at 08:11

## 2019-11-19 RX ADMIN — BICALUTAMIDE 50 MG: 50 TABLET, FILM COATED ORAL at 09:11

## 2019-11-19 RX ADMIN — PANTOPRAZOLE SODIUM 40 MG: 40 TABLET, DELAYED RELEASE ORAL at 09:11

## 2019-11-19 NOTE — PLAN OF CARE
Plan of care reviewed with pt. Pt AAOx4, NAD. Pt reports very mild lower abd pain. No SOB, n/v, dizziness or lightheadedness. VS stable. Pt remains afebrile. Pt tolerating upgrade to regular diet. 1 episode of loose stool with blood noted. Pt continent of bladder. Pt remains free from falls or injury. Safety measures implemented. Bed is lowered and locked. Call light is within reach. Bed alarm set. Will continue to monitor pt.

## 2019-11-19 NOTE — PROGRESS NOTES
Gastroenterology Progress Note    Active Hospital Problems    *Rectal bleeding      Debility      Chronic atrial fibrillation      Cardiac pacemaker      Essential hypertension      Warfarin anticoagulation        Subjective:  Patient seen and examined.  The patient is stable this AM. Only one episode of blood in stool yesterday.  Hb stable.  INR trending downward.    Reviews of Systems:  General:  Negative for fever or chills  Cardiovascular:  Negative for chest pain, shortness of breath, palpitations    Physical Exam    Vitals:  Vital Signs (Most Recent):  Temp: 97.8 °F (36.6 °C) (11/19/19 0740)  Pulse: 63 (11/19/19 0740)  Resp: 16 (11/19/19 0740)  BP: 122/61 (11/19/19 0740)  SpO2: 96 % (11/19/19 0740) Vital Signs (24h Range):  Temp:  [97.7 °F (36.5 °C)-99.2 °F (37.3 °C)] 97.8 °F (36.6 °C)  Pulse:  [60-93] 63  Resp:  [16-18] 16  SpO2:  [94 %-98 %] 96 %  BP: (119-170)/(56-70) 122/61     GEN: Well developed, well nourished in no apparent distress   HENT: Normocephalic, anicteric sclera   Cardiovascular: Regular rate and rhythm. No murmurs appreciated.   Chest: Non-labored respirations. Breath sounds equal   Abdomen: soft, NTND, no masses  Psych: Appropriate mood and affect.   Extermities: No C/C/E. 2+ dorsalis pedis pulses bilaterally  Skin: No new visible or palpable lesions.      Medications/Infusions:  Current Facility-Administered Medications   Medication Dose Route Frequency Provider Last Rate Last Dose    acetaminophen tablet 325 mg  325 mg Oral Q6H PRN Yaima Donnelly PA-C        bicalutamide tablet 50 mg  50 mg Oral Daily Yaima Donnelly PA-C   50 mg at 11/18/19 0939    ondansetron injection 4 mg  4 mg Intravenous Q6H PRN Yaima Donnelly PA-C        pantoprazole EC tablet 40 mg  40 mg Oral Daily Yaima Donnelly PA-C   40 mg at 11/18/19 0939    simvastatin tablet 10 mg  10 mg Oral QHS Yaima Donnelly PA-C   10 mg at 11/18/19 2022    sodium chloride 0.9% flush 10 mL  10 mL Intravenous PRN Carlito GALARZA  MD Delta           Intake and Output:    Intake/Output Summary (Last 24 hours) at 11/19/2019 0814  Last data filed at 11/19/2019 0500  Gross per 24 hour   Intake 477 ml   Output 300 ml   Net 177 ml       Labs:    Results for JULITA SIMON (MRN [id number]) as of 11/19/2019 08:15   Ref. Range 11/17/2019 04:52 11/18/2019 04:29   Hemoglobin Latest Ref Range: 14.0 - 18.0 g/dL 11.6 (L) 11.1 (L)   Hematocrit Latest Ref Range: 40.0 - 54.0 % 35.8 (L) 35.1 (L)     Results for JULITA SIMON (MRN [id number]) as of 11/19/2019 08:15   Ref. Range 11/19/2019 04:54   Protime Latest Ref Range: 9.0 - 12.5 sec 28.1 (H)   Coumadin Monitoring INR Latest Ref Range: 0.8 - 1.2  2.5 (H)     Imaging and other studies:    No new    Assessment:    Julita Simon is a 89 y.o. male with a history of HTN, HLD, afib ,and prostate cancer who presented with bright red blood per rectum. He has been hemodynamically stable, Hb stable, INR trending downward    Plan:    1. Continue with current diet  2.  No endoscopic plans at this time given clinical stability  3.  Continue to hold coumadin, INR remains elevated.

## 2019-11-19 NOTE — SUBJECTIVE & OBJECTIVE
Interval History: INR 2.5 down from 3.5; report of one small volume stool with dark red blood noted; H/H stable and will continue to monitor    Review of Systems   Constitutional: Negative for appetite change (improving), fatigue and fever.   HENT: Negative for congestion and trouble swallowing.    Eyes: Negative for photophobia and visual disturbance.   Respiratory: Negative for cough and shortness of breath.    Cardiovascular: Negative for chest pain and palpitations.   Gastrointestinal: Positive for blood in stool. Negative for abdominal pain, diarrhea, nausea, rectal pain and vomiting.   Genitourinary: Negative for difficulty urinating, frequency and hematuria.   Musculoskeletal: Negative for back pain and myalgias.   Skin: Negative.    Neurological: Negative for dizziness, weakness, light-headedness and headaches.   Hematological: Does not bruise/bleed easily.   Psychiatric/Behavioral: Negative.      Objective:     Vital Signs (Most Recent):  Temp: 97.6 °F (36.4 °C) (11/19/19 1156)  Pulse: 88 (11/19/19 1200)  Resp: 16 (11/19/19 1156)  BP: (!) 155/67 (11/19/19 1156)  SpO2: 96 % (11/19/19 1156) Vital Signs (24h Range):  Temp:  [97.6 °F (36.4 °C)-99.2 °F (37.3 °C)] 97.6 °F (36.4 °C)  Pulse:  [60-93] 88  Resp:  [16-18] 16  SpO2:  [94 %-96 %] 96 %  BP: (122-155)/(56-67) 155/67     Weight: 71.4 kg (157 lb 6.5 oz)  Body mass index is 23.93 kg/m².    Intake/Output Summary (Last 24 hours) at 11/19/2019 1407  Last data filed at 11/19/2019 1300  Gross per 24 hour   Intake 360 ml   Output 200 ml   Net 160 ml      Physical Exam   Constitutional: He is oriented to person, place, and time. He appears well-developed and well-nourished. No distress.   Elderly male in no distress   HENT:   Head: Normocephalic and atraumatic.   Mouth/Throat: Oropharynx is clear and moist.   Eyes: Pupils are equal, round, and reactive to light. Conjunctivae and EOM are normal. No scleral icterus.   Neck: Normal range of motion. Neck supple.    Cardiovascular: Normal rate, regular rhythm and intact distal pulses.   Pulses:       Dorsalis pedis pulses are 2+ on the right side, and 2+ on the left side.   Irregularly irregular   Pulmonary/Chest: Effort normal and breath sounds normal. No respiratory distress. He has no wheezes.   Abdominal: Soft. Bowel sounds are normal. He exhibits no distension. There is no tenderness. There is no rebound and no guarding.   Musculoskeletal: Normal range of motion. He exhibits no edema or deformity.   Neurological: He is alert and oriented to person, place, and time. He has normal strength. No sensory deficit.   Skin: Skin is warm and dry. Capillary refill takes less than 2 seconds. No pallor.   Psychiatric: He has a normal mood and affect. His behavior is normal. Cognition and memory are normal.   Nursing note and vitals reviewed.      Significant Labs:   CBC:   Recent Labs   Lab 11/18/19  0429   WBC 7.99   HGB 11.1*   HCT 35.1*        CMP:   Recent Labs   Lab 11/18/19  0429      K 4.4      CO2 21*   GLU 87   BUN 18   CREATININE 1.0   CALCIUM 9.7   ANIONGAP 10   EGFRNONAA >60     All pertinent labs within the past 24 hours have been reviewed.    Significant Imaging:     All pertinent studies within the past 24 hours have been reviewed.

## 2019-11-19 NOTE — PLAN OF CARE
Problem: Physical Therapy Goal  Goal: Physical Therapy Goal  Description  Patient will increase functional independence with mobility by performin. Sit<>stand with SPV with rollator.  2. Gait x 150 feet with SPV with rollator.   Outcome: Ongoing, Progressing   Pt sup to sit SBA with use of bed rail, sit to stand CGA, amb'd 125' and 70' with rollator and CGA/SBA, seated rest req'd in hallway and pt req'd min.A for using seat of rollator. HHPT and assistance from staff with amb.

## 2019-11-19 NOTE — ASSESSMENT & PLAN NOTE
- per medical record, [employer] Pacemaker with most recent interrogation 11/13/2019 in Media Section  - follows Dr. Gonzales, Cardiology     Physical Therapy Daily Treatment      Visit Count: 35  Insurance Information: See below  Work Injury Information: WORK COMP  ARCH INSURANCE   [address]  [place] [address]  [phone]  [employer]  DOI: 12/21/2016   # [id number]  Current Employer:[employer]   : unknown at this time  Restrictions: See below  Full Duty Work Demands: medium (20 - 50 lbs)   Claim Number: [id number]  Claim Status: claim open and in good standing  Current Work Status: full time occupation: [employer]    [phone] Sherrie Howe   Next Referring Provider Visit: 8-24-17    Referred by: Dr. Gregorio Arredondo MD  Medical Diagnosis (from order): History of arthroscopy of left shoulder [Z98.890]   Additional Information from Order: Based on evaluation, occupational or physical therapists may be utilized, unless otherwise indicated here.  Physical therapy evaluation and treatment  S/p Left shoulder arthroscopy, rotator cuff reconstruction (subscapularis and anterior supraspinatus tendon), biceps tenodesis, subacromial decompression with acromioplasty and bursectomy   Large RC repair protocol with subscapularis tendon repair  Treatments and Modalities per therapist plan of care  Insurance: 1. WC-PAYOR UNKNOWN  2. N/A    Date of Surgery: 3-9-17; surgery performed: left shoulder arthroscopy, rotator cuff reconstruction (subscapularis and supraspinatus), subacromial decompression, biceps tenodesis; rehabilitation guidelines: yes    Diagnosis Precautions:   Restrictions Per MD follow-up on 7-27-17:  Max 15 lbs lifting with left upper extremity   below chest level with occasional reaching.  Max 5 lbs lifting with left upper extremity   above chest level with occasional reaching.  No climbing ladders.  Maximum 8 hours/day.   Must be able to attend therapy appointments   Restrictions are to be followed at work and at home  Restrictions are in effect until follow-up  visit.    Relevant co-morbidities and medications: Diabetes mellitus, hypertension, anxiety   Relevant Tests: Relevant diagnostic tests: plain film radiograph, MRI     Case Notes/Attendance:  Contact with Adjustor /     · Adjustor / :  unknown at this time  · Phone#: unknown at this time  · Date of Communication: n/a; Results: none at this time  Attendance Concerns: none at this time    SUBJECTIVE   Continued soreness/tenderness in his anterior shoulder. He reports that he has mentioned this to  at his last visit and that he was not concerned about it.     Current Pain: 1-2/10 in anterior right shoulder and soreness in tricep    Functional Change: Compliance with HEP. Continues to notice difficulty with reaching overhead and out to side in standing    OBJECTIVE   Observation:   -Incisions healing well, no visible signs of infection.    Range of Motion (degrees): Shoulder Range of Motion  [] All motions within functional/normal limits except those noted.   [x] Only those motions that were assessed are noted.   [] Uninvolved motion within functional/normal limits.    Norm Left Right Left Left Left Left   Date   Initial Initial  5/31 6/21/17 6/26/17 7/20/17   Flexion 170° -180 °    P: 150  A: 120* A: 135 in sitting  A: 155 in supine A: 155 in sitting  A: 168 in supine A: 158 in sitting  A: 170 in supine   Extension 50°-60°   75  A: 52 A: 60 dg in sitting A: 65 dg in sitting A: 70 dg in sitting     Abduction 170° -180°   165 P: 150  A: 83* A: 125 in sitting  A: 150 in sitting  A: 160 in supine A: 155 in sitting  A: 175 in supine   Adduction 50°-75°            Internal Rotation 60°-100°   90  A: 85 A: 85 in supine A: 60 in supine A: 80 dg in supine  Functional: Low lumber, 2-3 inch difference from Right   External Rotation 80°-90°   70  A: 70 A: 85 in supine A: 95 in supine A: 95 in supine  Functional: Upper thoracic 1 inch difference from right   [standard testing positions  unless otherwise noted]  Comments:A=active, P=passive; * denotes pain     Strength: Shoulder (out of 5)  [] Gross muscle strength within functional/normal limits except as noted.  [x] Only muscle strength that was assessed are noted.  [] Uninvolved muscle strength within functional/normal limits.    Left Right Left Left Left   Date Initial Initial 5/31 6/21/17 6/26/17   Flexion   5 4  4* 4+*   Extension   5  4 4+ 5   Abduction   5  4 4* 4+*   Adduction   5  4 4+ 4+   Internal Rotation   5  4 4+ 4+*   External Rotation   5  4 4 4*   [standard testing positions unless otherwise noted]   Comments: left side not tested due to post-surgical status;  *denotes pain   6-21-17: Reports mild pain with muscle testing indicated by *; pain felt in anterior shoulder  6-26-17: Again mild increase in anterior shoulder pain       Functional Status:   Functional Activity Patient  Abilities  Date: 6-26-17 Job Demands  Client Report   Lift floor to waist  20-50#, sometimes up to 70 pounds; 1-2 times per day, 15-20 gallons   Lift waist to chest  40#   Lift waist to overhead  40#, A few times per year   Two Hand Carry  Max 100 feet   20-60#, At most 2x/day, Would have a handle   Push/Pull  Wheelbarrow, carts with motor and a pump,  Smaller ones up to 100 pounds, some up to 200 pounds  Occasionally   Sustained overhead work     Sustained sitting     Sustained standing  11 hours out of 12 hour shifts   Sustained kneeling  Short periods, occasionally   Climb Ladder Unable Multiple times per shift   Other: Shoveling      Areas blank above not yet assessed due to not medically appropriate.  Patient unable to perform any above task due to post-surgical lifting restrictions. Will be assessed when appropriate.    Use of special tools or equipment requirements: Patient needs to be able to climb ladder     Outcome Measures:   Initial: Disabilities of the Arm, Shoulder and Hand (DASH): DASH Score Calculated: 43.33 (scored 0-100; a higher score  indicates greater disability)   6-26-17: Disabilities of the Arm, Shoulder and Hand (DASH): DASH Score Calculated: 19.17 (scored 0-100; a higher score indicates greater disability)   7-17-17: Disabilities of the Arm, Shoulder and Hand (DASH): DASH Score Calculated: 24.17    (scored 0-100; a higher score indicates greater disability)     Treatment     Manual Therapy: HELD THIS DATE, left shoulder with patient in supine   Trigger point pressure to muscular trigger points in inferior cuff/subscapularis  Pin and stretch to inferior cuff with patient performing active flexion range of motion, 4x30 seconds    Therapeutic Exercise: 45 minutes (Including time on BTE described below)  UBE: HELD THIS DATE  level 2.5 3 minutes forward, 3 minutes retro. 6 minutes total  Nustep: 5 minutes using upper and lower extremities, resistance level 7. Seat level 13, arms at level 11.   Pectoral Stretch in doorway, 2 minutes  Exercise 8-7-17 8-10-17 8-14-17 8-17-17 Position/Cues   Flexion  Stretch against wall, 2 minutes Stretch against wall, 2x30 seconds     Abduction  -Shoulder abduction in supine on sliding board  -Wall sides, perpendicular to the wall. 20x  -In sidelying 15x without resistance  15x with 1.5# weight -resisted against yellow TB with hands positioned against wall 10x to each side -PNF D2 Flexion against yellow TB x15  --resisted against yellow TB with hands positioned against wall, adding diagonals alternating between left and right, 10x to each side in each direction.  -On incline bench, 3# notch 3  3x10     Adduction        rhythmic stabilizations     Patient in supine with shoulder flexed to 90 degrees    Scapular punches   -Press up on incline bench with serratus plus 5# weighted wand 3x10     External rotation   -Walk outs with shoulder and elbow flexed to 90 degrees against green TB 15x     Internal rotation   -Walk outs with shoulder and elbow flexed to 90 degrees against green TB 15x     Rows \"W\" row against  green TB anchored above shoulder height    Verbal cues for arm position and to reduce upper trapezius compensation   Shoulder Extension        Sleeper Stretch        Bicep Curls        Sidelying: open book        Eccentric flexion        Wall push ups with plus        Red plyo ball against wall shoulder flexion lift offs   Bounces with arm positioned in overhead flexion, 2 x 1 minute     Prone Scapular Strengthening -Against wall on elbows: scapular retraction  2x10 resistance against red theraband, resistance against shoulder external rotation  -Scapular setting 10x  -Scapular setting with extension  x15 no resistance, x5 with 1.5#  -Scapular setting + \"W\" row without resistance x15  -Serratus activation in prone on elbows, x5  x15 against resistance of red theraband     Lunges + Overhead flexion with 5# hand weights         Box Lifts        Comments: all completed on left unless noted    Therapeutic Exercise: Only performed actions in bold listed this date  BTE II unless otherwise indicated  Exercise and tool:  Height Patient position Machine head position Mode Weight Work  Joules  Time seconds   Scapular protraction  Using 802 tool  Fulcrum: 13 HH: 47 Standing with shoulder flexed to 90 degrees HT:7 Daily Treatment 7.3 pounds 217986.59 72   Internal Rotation using 802 tool, [place]: 16.5        HH: 47 Standing with shoulder abducted to ~80 degrees, externally rotated HT: 5 Daily Treatment 4 pounds  (clockwise) 061560.11 158   External Rotation at waist height; using 802 tool, [place]: 16.5 HH: 36 Standing HT: 7 Daily Treatment 71 pounds (counter clockwise) 663910.59 115   Pulling Ability   Tool 802  Fulcrum: 16 HH: 38  (top of machine) Standing HT: 7 Daily treatment 6.8 pounds 518245.09 45   Pushing Ability   Tool 802  Fulcrum: 16 HH: 38 (top of machine) Standing HT: 7 Daily treatment 6 pounds 547133.82 64   Flexion/ Overhead Press  Tool 802  Fulcrum: 16 HH: 59 Standing HT: 3 Daily Treatment 6.9 pounds 386108.57  65   Abduction (0-45 degrees)  Tool 802 HH: 49 Standing HT: 3 Daily Treatment 5.2 pounds (Counterclockwise) 538669.01 48   Ladder Climb using tool 181 HH: 62.5 Standing HT: 3 Daily Treatment 12.7 pounds 9583773.6 141   Ladder Climb using tool 181 HH: 62.5 Standing HT: 3 Daily Treatment 12 pounds [id number]  [id number] 60  60     Ladder Climb using tool 181  With 8\" step up HH 67.5 Standing HT: 3 Line Graph Test Average: 1st set in lbs   40.5  35.5  34.0  1 sets of 3 trials of 30 seconds                                           Comments: Complete static trial every 3-4 weeks, last assessed on 8-17-17   (In BTE program, internal and external rotation labels are reversed, correctly listed in electronic medical record).       Home Exercise Program:  Exercise: Date issued Date DC Comments   Pendulums, scapular retraction, active elbow and wrist ROM 3-23-17   Reviewed scapular retraction on 4-26-17    Pulleys- facing anchor 5-3-17     Patient educated to perform only if cleared by MD tomorrow.    Table slides- flexion and scapular abduction, flexion wand exercise, submaximal ER and IR isometric 5-9-17     [employer] access code: [email or web address]    Sleeper stretch, internal rotation stretch 5-22-17        Sidelying external rotation 5/31 6-2-17 recommended adding 1-2# to exercise at home.    Wall slides- flexion 6-2-17     Sleeper stretch 6/9/17     Pec stretch on rolled towel 6-15-17  [employer] access code: [email or web address]   Prone Scapular strengthening 6-26-17     Child's pose, flexion stretch with back against wall 7-20-17     Wall walk against yellow TB 7-30-17         ASSESSMENT   Don presents with continued mild anterior shoulder pain this date. Session focused on re-assessment of all static tests on the BTE. Patient's max force improved on all static tests and was able to tolerate increased resistance during treatment this date. Don reported minor increase in anterior shoulder discomfort during some of the overhead exercises and  fatigue during treatment. His pain reduced following activities.  He requires continued scapular stabilization and postural retraining to reduce symptoms of impingement.     Don requires additional skilled therapy to improve scapular stability, to advance strength to return to prior level of function, especially to be able to return to physical requirements of job including climbing a ladder, lifting 20-50# from floor to waist, waist to chest, and waist to overhead as well as two hand carries and pushing ability.       Pain after treatment: 2/10      Goals:       To be obtained by end of this plan of care:  1. Patient independent with modified and progressed home exercise program. Progressing  2. Patient will decrease involved shoulder pain to 0/10 to aid in normalization of upper extremity movements to aid activities of independent daily living, completion of self care tasks. Not met, inconsistent  3. Patient will increase involved shoulder active range of motion to abduction 170°, flexion 170° to aid in tolerating repetitive overhead/upper extremity activity, completion of self care tasks, returning to work/school.  7-19-17 Met in supine, not in unsupported  4. Patient will increase involved shoulder strength to 5/5 to aid in tolerating repetitive overhead/upper extremity activity, returning to work/school. 7-24-17: not met  5. Patient will be able to reach behind back without pain/difficulty to improve function in dressing. Goal met, reports no difficulty dressing  6. Patient will be able to reach over head without pain/difficulty to improve function in cooking, reaching into cupboard, grooming. 7-24-17, goal met  7. Patient will be able to sleep 6 hours without disruption from pain. 7-24-17 Goal met  8. Patient/Client will be able to lift up to 50 pounds from floor to waist with proper body mechanics to assist with lifting activities at work. 7-24-17: not met  9. Patient/Client will demonstrate 170 degrees of  flexion active range of motion to assist with their ability to climb ladders at work 6-26-17: not met in sitting or supine  10. Patient/Client will demonstrate 5/5 strength of shoulder flexors to assist with their ability to climb ladders at work. 6-26-17: not met  11. DASH: Patient will complete form to reflect an improved score from initial score of 43.33 to less than or equal to 15 (scored 0-100; a higher score indicates greater disability) to indicate pt reported improvement in function/disability/impairment (minimal detectable change: 15 points). 7-17-17; not met scored 24.17    (7-24-17, Patient has met 3/11 therapy goals. Making good progress toward other goals. All remain ongoing and appropriate.)     PLAN   -Warm-up on UBE or on Nustep or elliptical with upper and lower extremities   -Continue training in scapular retraction and depression for postural strength- perform in prone or against wall  -BTE with 10% increase in work  -Work simulation:    -Perform squats and lunges with handweights for lifting simulation  -Continue working on end range flexion and abduction ROM in unsupported standing and sitting    THERAPY DAILY BILLING   Primary Insurance: WC-PAYOR UNKNOWN  Secondary Insurance: N/A    Evaluation Procedures:  No evaluation codes were used on this date of service    Timed Procedures:  Therapeutic Exercise, 45 minutes        Untimed Procedures:  No untimed codes were used on this date of service    Total Treatment Time: 45 minutes

## 2019-11-19 NOTE — UM SECONDARY REVIEW
Per ehr md team faisal - case appro  inpt  since admit all days - 11-18 - message sent to delfin Adamson for upgrade -  tdb er coverage- pending her upgrade

## 2019-11-19 NOTE — ASSESSMENT & PLAN NOTE
- history of falls on anticoagulation  - PT/OT evaluated and recommending home PT/OT services  - fall precautions

## 2019-11-19 NOTE — PROGRESS NOTES
[facility] Hospital Medicine  Progress Note    Patient Name: Stone Lucio  MRN: [id number]  Patient Class: IP- Inpatient   Admission Date: 11/16/2019  Length of Stay: 0 days  Attending Physician: Luz Weinberg MD  Primary Care Provider: Drew Castro MD        Subjective:     Principal Problem:Rectal bleeding        HPI:  Jovan Donnelly PA:  89 y/o male with PMH HTN, HLD, prostate cancer, Atrial fibrillation on coumadin presents to ED with c/o complaint of rectal bleeding for the past week, worse today. Reports that he noticed over the last 2 weeks, blood in his undergarments and knew something was going on. States that yesterday and today he had gross blood in his stool when he had a BM and came to ED. Denies every having a similar episode. Denies HA, dizziness, SOB, CP, N/V, abdominal pain, syncope. ED evaluation H&H stable, ECG afib, INR 3.1; hemodynamically stable, placed in Observation.     Overview/Hospital Course:  Mr. Stone Lucio was admitted for evaluation of report of hematochezia first noted approximately two weeks prior to ED arrival.  He is on chronic anticoagulation with coumadin and INR was found to be elevated 3.1, 3.5 and coumadin was subsequently held. He follows [facility] at [facility].   He had one episode of rectal bleeding after admission and GI was consulted who felt no need for urgent endoscopy.  His INR with morning labs showed trend downward. Hemoglobin and hematocrit have remained stable.  However, he had one additional episode of hematochezia today.  Will continue to hold Coumadin at this time.  Monitor hemoglobin/hematocrit.  Of note, the patient has a [employer] Advantio Pacemaker placed for atrial fibrillation.  Most recent interrogation was November 13, 2019.  His Cardiologist is Dr. Gnozales.      Interval History: INR 2.5 down from 3.5; report of one small volume stool with dark red blood noted; H/H stable and will continue to monitor    Review of  Systems   Constitutional: Negative for appetite change (improving), fatigue and fever.   HENT: Negative for congestion and trouble swallowing.    Eyes: Negative for photophobia and visual disturbance.   Respiratory: Negative for cough and shortness of breath.    Cardiovascular: Negative for chest pain and palpitations.   Gastrointestinal: Positive for blood in stool. Negative for abdominal pain, diarrhea, nausea, rectal pain and vomiting.   Genitourinary: Negative for difficulty urinating, frequency and hematuria.   Musculoskeletal: Negative for back pain and myalgias.   Skin: Negative.    Neurological: Negative for dizziness, weakness, light-headedness and headaches.   Hematological: Does not bruise/bleed easily.   Psychiatric/Behavioral: Negative.      Objective:     Vital Signs (Most Recent):  Temp: 97.6 °F (36.4 °C) (11/19/19 1156)  Pulse: 88 (11/19/19 1200)  Resp: 16 (11/19/19 1156)  BP: (!) 155/67 (11/19/19 1156)  SpO2: 96 % (11/19/19 1156) Vital Signs (24h Range):  Temp:  [97.6 °F (36.4 °C)-99.2 °F (37.3 °C)] 97.6 °F (36.4 °C)  Pulse:  [60-93] 88  Resp:  [16-18] 16  SpO2:  [94 %-96 %] 96 %  BP: (122-155)/(56-67) 155/67     Weight: 71.4 kg (157 lb 6.5 oz)  Body mass index is 23.93 kg/m².    Intake/Output Summary (Last 24 hours) at 11/19/2019 1407  Last data filed at 11/19/2019 1300  Gross per 24 hour   Intake 360 ml   Output 200 ml   Net 160 ml      Physical Exam   Constitutional: He is oriented to person, place, and time. He appears well-developed and well-nourished. No distress.   Elderly male in no distress   HENT:   Head: Normocephalic and atraumatic.   Mouth/Throat: Oropharynx is clear and moist.   Eyes: Pupils are equal, round, and reactive to light. Conjunctivae and EOM are normal. No scleral icterus.   Neck: Normal range of motion. Neck supple.   Cardiovascular: Normal rate, regular rhythm and intact distal pulses.   Pulses:       Dorsalis pedis pulses are 2+ on the right side, and 2+ on the left side.    Irregularly irregular   Pulmonary/Chest: Effort normal and breath sounds normal. No respiratory distress. He has no wheezes.   Abdominal: Soft. Bowel sounds are normal. He exhibits no distension. There is no tenderness. There is no rebound and no guarding.   Musculoskeletal: Normal range of motion. He exhibits no edema or deformity.   Neurological: He is alert and oriented to person, place, and time. He has normal strength. No sensory deficit.   Skin: Skin is warm and dry. Capillary refill takes less than 2 seconds. No pallor.   Psychiatric: He has a normal mood and affect. His behavior is normal. Cognition and memory are normal.   Nursing note and vitals reviewed.      Significant Labs:   CBC:   Recent Labs   Lab 11/18/19 0429   WBC 7.99   HGB 11.1*   HCT 35.1*        CMP:   Recent Labs   Lab 11/18/19 0429      K 4.4      CO2 21*   GLU 87   BUN 18   CREATININE 1.0   CALCIUM 9.7   ANIONGAP 10   EGFRNONAA >60     All pertinent labs within the past 24 hours have been reviewed.    Significant Imaging:     All pertinent studies within the past 24 hours have been reviewed.          Assessment/Plan:      * Rectal bleeding  - blood in stool for last 2 weeks, worsened last 2 days on AC  - continue to monitor H&H and continue oral pantoprazole 40 mg daily  - diet advanced and one subsequent episode of dark red blood in small volume stool  - observe, no plan as of now for endoscopic procedures  - case discussed with GI, Dr. Gutierrez      History of prostate cancer  - appears stable  - continue bicalutamide 50 mg daily  - follows Dr. Monteiro, Urology      Debility  - history of falls on anticoagulation  - PT/OT evaluated and recommending home PT/OT services  - fall precautions      Warfarin anticoagulation  - chronic use of anticoagulation with atrial fibrillation  - home regimen: 3 mg every Wednesday and Friday; then 4.5 mg Monday, Tuesday, Thursday, Saturday and Sunday  - levels managed by Coumadin  Clinic  - elevated on arrival 3.1 >> 3.5 >> now 2.5  - continue to hold for now in setting of GI bleeding  - monitor for signs/symptoms of stroke while not on anticoagulation      Pure hypercholesterolemia  - appears stable  - continue current regimen with simvastatin 10 mg daily      Essential hypertension  - reasonably stable  - home regimen, furosemide and will continue to hold for now  - monitor      Cardiac pacemaker  - per medical record, [employer] Pacemaker with most recent interrogation 11/13/2019 in Media Section  - follows Dr. Gonzales, Cardiology      Chronic atrial fibrillation  - rate controlled  - per medical record, [employer] Pacemaker in place  - chronic anticoagulation with Coumadin and managed by [facility]in Clinic   - continue to hold Coumadin  in setting of GI bleed  - close monitor for signs and symptoms of stroke while not on anticoagulation      VTE Risk Mitigation (From admission, onward)         Ordered     Place sequential compression device  Until discontinued      11/16/19 1522     IP VTE HIGH RISK PATIENT  Once      11/16/19 1522     Reason for No Pharmacological VTE Prophylaxis  Once     Question:  Reasons:  Answer:  Active Bleeding    11/16/19 1522                      Kimmy Rodney NP  Department of Hospital Medicine   [facility]

## 2019-11-19 NOTE — PLAN OF CARE
D/c plan:  Return to NH  Move to Inpatient Unit (318)  Will follow        11/19/19 1602   Discharge Reassessment   Assessment Type Discharge Planning Reassessment   Provided patient/caregiver education on the expected discharge date and the discharge plan Yes   Do you have any problems affording any of your prescribed medications? No   Discharge Plan A Return to nursing home   Discharge Plan B Skilled Nursing Facility   Patient choice form signed by patient/caregiver N/A   Can the patient answer the patient profile reliably? Yes, cognitively intact   How does the patient rate their overall health at the present time? Good   Describe the patient's ability to walk at the present time. Minor restrictions or changes

## 2019-11-19 NOTE — ASSESSMENT & PLAN NOTE
- blood in stool for last 2 weeks, worsened last 2 days on AC  - continue to monitor H&H and continue oral pantoprazole 40 mg daily  - diet advanced and one subsequent episode of dark red blood in small volume stool  - observe, no plan as of now for endoscopic procedures  - case discussed with GI, Dr. Gutierrez

## 2019-11-19 NOTE — ASSESSMENT & PLAN NOTE
- rate controlled  - per medical record, [employer] Pacemaker in place  - chronic anticoagulation with Coumadin and managed by [facility]   - continue to hold Coumadin  in setting of GI bleed  - close monitor for signs and symptoms of stroke while not on anticoagulation

## 2019-11-19 NOTE — PLAN OF CARE
The pt's V/S were monitored throughout the shift. The pt's V/S remained stable. The pt ambulated to the bedside comode with assistance. The pt remained free from falls and trauma. The pt denied any Dizziness, SOB, Pain, or discomfort. Purposeful rounding was performed. The pt's bed remained in the lowest position with the bed wheels locked. Bed alarm set. Call light within reach. NAD noted. Will continue to monitor.

## 2019-11-19 NOTE — ASSESSMENT & PLAN NOTE
- chronic use of anticoagulation with atrial fibrillation  - home regimen: 3 mg every Wednesday and Friday; then 4.5 mg Monday, Tuesday, Thursday, Saturday and Sunday  - levels managed by [facility]  - elevated on arrival 3.1 >> 3.5 >> now 2.5  - continue to hold for now in setting of GI bleeding  - monitor for signs/symptoms of stroke while not on anticoagulation

## 2019-11-19 NOTE — PT/OT/SLP PROGRESS
"Physical Therapy Treatment    Patient Name:  Stone Lucio   MRN:  [id number]    Recommendations:     Discharge Recommendations:  home health PT(and assistance with amb. from staff initially)   Discharge Equipment Recommendations: none   Barriers to discharge: None    Assessment:     Stone Lucio is a 89 y.o. male admitted with a medical diagnosis of Rectal bleeding.  He presents with the following impairments/functional limitations:  weakness, impaired endurance, impaired self care skills, impaired functional mobilty, gait instability, impaired balance, decreased lower extremity function, pain, decreased safety awareness ;pt with good mobililty today, though req's CGA for safety with amb. At this time, and min.A for proper use of rollator (turning and sitting down/standing up).    Rehab Prognosis: Good; patient would benefit from acute skilled PT services to address these deficits and reach maximum level of function.    Recent Surgery: * No surgery found *      Plan:     During this hospitalization, patient to be seen 5 x/week to address the identified rehab impairments via gait training, therapeutic activities, therapeutic exercises and progress toward the following goals:    · Plan of Care Expires:  11/30/19    Subjective     Chief Complaint: L thigh pain  Patient/Family Comments/goals: pt reports previous fx. Of LLE, and "soreness" in thigh area.  Pain/Comfort:  · Pain Rating 1: 5/10  · Location - Side 1: Left  · Location 1: thigh  · Pain Addressed 1: Reposition, Distraction  · Pain Rating Post-Intervention 1: 5/10      Objective:     Communicated with nurse prior to session.  Patient found supine with peripheral IV upon PT entry to room.     General Precautions: Standard, fall   Orthopedic Precautions:N/A   Braces: N/A     Functional Mobility:  · Bed Mobility:     · Supine to Sit: stand by assistance and use of bedrail  · Transfers:     · Sit to Stand:  contact guard assistance with 4 wheeled walker  · Gait: " amb'd 125' and 70' with rollator and CGA/SBA, occ min.A for turns and using seat; pt req'd cueing for safety and seated rest during amb.      AM-PAC 6 CLICK MOBILITY  Turning over in bed (including adjusting bedclothes, sheets and blankets)?: 4  Sitting down on and standing up from a chair with arms (e.g., wheelchair, bedside commode, etc.): 3  Moving from lying on back to sitting on the side of the bed?: 4  Moving to and from a bed to a chair (including a wheelchair)?: 3  Need to walk in hospital room?: 3  Climbing 3-5 steps with a railing?: 3  Basic Mobility Total Score: 20       Therapeutic Activities and Exercises:   per'fd seated LE ex's of AP's,heelraises, LAQ's x 10 ea.   Recommended to pt that when he returns to [facility] living, someone assist him with walking until he's stronger.      Patient left up in chair with all lines intact, call button in reach and nurse present..    GOALS:   Multidisciplinary Problems     Physical Therapy Goals        Problem: Physical Therapy Goal    Goal Priority Disciplines Outcome Goal Variances Interventions   Physical Therapy Goal     PT, PT/OT Ongoing, Progressing     Description:  Patient will increase functional independence with mobility by performin. Sit<>stand with SPV with rollator.  2. Gait x 150 feet with SPV with rollator.                    Time Tracking:     PT Received On: 19  PT Start Time: 918     PT Stop Time: 956  PT Total Time (min): 38 min     Billable Minutes: Gait Training 25 and Therapeutic Exercise 13    Treatment Type: Treatment  PT/PTA: PTA     PTA Visit Number: 1     Steffi Santos PTA  2019

## 2019-11-20 LAB
ANION GAP SERPL CALC-SCNC: 8 MMOL/L (ref 8–16)
BASOPHILS # BLD AUTO: 0.03 K/UL (ref 0–0.2)
BASOPHILS NFR BLD: 0.4 % (ref 0–1.9)
BUN SERPL-MCNC: 48 MG/DL (ref 10–30)
CALCIUM SERPL-MCNC: 8.9 MG/DL (ref 8.7–10.5)
CHLORIDE SERPL-SCNC: 110 MMOL/L (ref 95–110)
CO2 SERPL-SCNC: 23 MMOL/L (ref 23–29)
CREAT SERPL-MCNC: 2.6 MG/DL (ref 0.5–1.4)
DIFFERENTIAL METHOD: ABNORMAL
EOSINOPHIL # BLD AUTO: 0.3 K/UL (ref 0–0.5)
EOSINOPHIL NFR BLD: 3.5 % (ref 0–8)
ERYTHROCYTE [DISTWIDTH] IN BLOOD BY AUTOMATED COUNT: 13.4 % (ref 11.5–14.5)
EST. GFR  (AFRICAN AMERICAN): 23 ML/MIN/1.73 M^2
EST. GFR  (NON AFRICAN AMERICAN): 20 ML/MIN/1.73 M^2
GLUCOSE SERPL-MCNC: 91 MG/DL (ref 70–110)
HCT VFR BLD AUTO: 31.1 % (ref 40–54)
HGB BLD-MCNC: 9.9 G/DL (ref 14–18)
IMM GRANULOCYTES # BLD AUTO: 0.03 K/UL (ref 0–0.04)
IMM GRANULOCYTES NFR BLD AUTO: 0.4 % (ref 0–0.5)
INR PPP: 1.7 (ref 0.8–1.2)
LYMPHOCYTES # BLD AUTO: 1.8 K/UL (ref 1–4.8)
LYMPHOCYTES NFR BLD: 22.4 % (ref 18–48)
MCH RBC QN AUTO: 30.9 PG (ref 27–31)
MCHC RBC AUTO-ENTMCNC: 31.8 G/DL (ref 32–36)
MCV RBC AUTO: 97 FL (ref 82–98)
MONOCYTES # BLD AUTO: 0.6 K/UL (ref 0.3–1)
MONOCYTES NFR BLD: 6.9 % (ref 4–15)
NEUTROPHILS # BLD AUTO: 5.5 K/UL (ref 1.8–7.7)
NEUTROPHILS NFR BLD: 66.4 % (ref 38–73)
NRBC BLD-RTO: 0 /100 WBC
PLATELET # BLD AUTO: 189 K/UL (ref 150–350)
PMV BLD AUTO: 10.1 FL (ref 9.2–12.9)
POCT GLUCOSE: 99 MG/DL (ref 70–110)
POTASSIUM SERPL-SCNC: 4.4 MMOL/L (ref 3.5–5.1)
PROTHROMBIN TIME: 18.8 SEC (ref 9–12.5)
RBC # BLD AUTO: 3.2 M/UL (ref 4.6–6.2)
SODIUM SERPL-SCNC: 141 MMOL/L (ref 136–145)
WBC # BLD AUTO: 8.23 K/UL (ref 3.9–12.7)

## 2019-11-20 PROCEDURE — 80048 BASIC METABOLIC PNL TOTAL CA: CPT

## 2019-11-20 PROCEDURE — 25000003 PHARM REV CODE 250: Performed by: INTERNAL MEDICINE

## 2019-11-20 PROCEDURE — 99233 PR SUBSEQUENT HOSPITAL CARE,LEVL III: ICD-10-PCS | Mod: ,,, | Performed by: NURSE PRACTITIONER

## 2019-11-20 PROCEDURE — 36415 COLL VENOUS BLD VENIPUNCTURE: CPT

## 2019-11-20 PROCEDURE — 25000003 PHARM REV CODE 250: Performed by: PHYSICIAN ASSISTANT

## 2019-11-20 PROCEDURE — 11000001 HC ACUTE MED/SURG PRIVATE ROOM

## 2019-11-20 PROCEDURE — 97110 THERAPEUTIC EXERCISES: CPT

## 2019-11-20 PROCEDURE — 85610 PROTHROMBIN TIME: CPT

## 2019-11-20 PROCEDURE — 97116 GAIT TRAINING THERAPY: CPT

## 2019-11-20 PROCEDURE — 94761 N-INVAS EAR/PLS OXIMETRY MLT: CPT

## 2019-11-20 PROCEDURE — 99233 SBSQ HOSP IP/OBS HIGH 50: CPT | Mod: ,,, | Performed by: NURSE PRACTITIONER

## 2019-11-20 PROCEDURE — 85025 COMPLETE CBC W/AUTO DIFF WBC: CPT

## 2019-11-20 RX ORDER — POLYETHYLENE GLYCOL 3350, SODIUM SULFATE ANHYDROUS, SODIUM BICARBONATE, SODIUM CHLORIDE, POTASSIUM CHLORIDE 236; 22.74; 6.74; 5.86; 2.97 G/4L; G/4L; G/4L; G/4L; G/4L
4000 POWDER, FOR SOLUTION ORAL ONCE
Status: COMPLETED | OUTPATIENT
Start: 2019-11-20 | End: 2019-11-20

## 2019-11-20 RX ORDER — BISACODYL 5 MG
10 TABLET, DELAYED RELEASE (ENTERIC COATED) ORAL ONCE
Status: COMPLETED | OUTPATIENT
Start: 2019-11-20 | End: 2019-11-20

## 2019-11-20 RX ADMIN — BICALUTAMIDE 50 MG: 50 TABLET, FILM COATED ORAL at 09:11

## 2019-11-20 RX ADMIN — SIMVASTATIN 10 MG: 10 TABLET, FILM COATED ORAL at 09:11

## 2019-11-20 RX ADMIN — BISACODYL 10 MG: 5 TABLET, COATED ORAL at 03:11

## 2019-11-20 RX ADMIN — POLYETHYLENE GLYCOL 3350, SODIUM SULFATE ANHYDROUS, SODIUM BICARBONATE, SODIUM CHLORIDE, POTASSIUM CHLORIDE 4000 ML: 236; 22.74; 6.74; 5.86; 2.97 POWDER, FOR SOLUTION ORAL at 03:11

## 2019-11-20 RX ADMIN — PANTOPRAZOLE SODIUM 40 MG: 40 TABLET, DELAYED RELEASE ORAL at 09:11

## 2019-11-20 NOTE — PROGRESS NOTES
[facility] Hospital Medicine  Progress Note    Patient Name: Stone Lucio  MRN: [id number]  Patient Class: IP- Inpatient   Admission Date: 11/16/2019  Length of Stay: 1 days  Attending Physician: Luz Weinberg MD  Primary Care Provider: Drew Castro MD        Subjective:     Principal Problem:Rectal bleeding        HPI:  Per TOBY Donnelly PA:  89 y/o male with PMH HTN, HLD, prostate cancer, Atrial fibrillation on coumadin presents to ED with c/o complaint of rectal bleeding for the past week, worse today. Reports that he noticed over the last 2 weeks, blood in his undergarments and knew something was going on. States that yesterday and today he had gross blood in his stool when he had a BM and came to ED. Denies every having a similar episode. Denies HA, dizziness, SOB, CP, N/V, abdominal pain, syncope. ED evaluation H&H stable, ECG afib, INR 3.1; hemodynamically stable, placed in Observation.     Overview/Hospital Course:  Mr. Stone Lucio was admitted for evaluation of report of hematochezia first noted approximately two weeks prior to ED arrival.  He is on chronic anticoagulation with coumadin and INR was found to be elevated 3.1, 3.5 and coumadin was subsequently held. He follows [facility] at [facility].   He had one episode of rectal bleeding after admission and GI was consulted who felt no need for urgent endoscopy.  His INR with morning labs showed trend downward. Hemoglobin and hematocrit has gradually trended down from 13.1/40.5 to 9.9/31.1 with morning labs today.  He had one additional episode of hematochezia today.  Will continue to hold Coumadin at this time.  Discussed case with Dr. Gutierrez and will plan for endoscopy in morning.  NPO after midnight.     Of note, the patient has a [employer]vant[employer] Pacemaker placed for atrial fibrillation.  Most recent interrogation was November 13, 2019.  His Cardiologist is Dr. Gonzales.      Interval History: no events overnight; H/H  trending down 9.9/31; INR trending down and pending , but continue to hold Coumadin; GI following and plan for endoscopy in morning    Review of Systems   Constitutional: Negative for appetite change (improving), fatigue and fever.   HENT: Negative for congestion and trouble swallowing.    Eyes: Negative for photophobia and visual disturbance.   Respiratory: Negative for cough and shortness of breath.    Cardiovascular: Negative for chest pain and palpitations.   Gastrointestinal: Positive for blood in stool. Negative for abdominal pain, diarrhea, nausea, rectal pain and vomiting.   Genitourinary: Negative for difficulty urinating, frequency and hematuria.   Musculoskeletal: Negative for back pain and myalgias.   Skin: Negative.    Neurological: Negative for dizziness, weakness, light-headedness and headaches.   Hematological: Does not bruise/bleed easily.   Psychiatric/Behavioral: Negative.      Objective:     Vital Signs (Most Recent):  Temp: 96.8 °F (36 °C) (11/20/19 0744)  Pulse: 60 (11/20/19 0744)  Resp: 20 (11/20/19 0744)  BP: 138/66 (11/20/19 0744)  SpO2: 98 % (11/20/19 0744) Vital Signs (24h Range):  Temp:  [96.1 °F (35.6 °C)-98.1 °F (36.7 °C)] 96.8 °F (36 °C)  Pulse:  [56-88] 60  Resp:  [16-20] 20  SpO2:  [94 %-98 %] 98 %  BP: (137-155)/(61-67) 138/66     Weight: 71.4 kg (157 lb 6.5 oz)  Body mass index is 23.93 kg/m².    Intake/Output Summary (Last 24 hours) at 11/20/2019 0749  Last data filed at 11/20/2019 0600  Gross per 24 hour   Intake 480 ml   Output 725 ml   Net -245 ml      Physical Exam   Constitutional: He is oriented to person, place, and time. He appears well-developed and well-nourished. No distress.   Elderly male in no distress   HENT:   Head: Normocephalic and atraumatic.   Mouth/Throat: Oropharynx is clear and moist.   Eyes: Pupils are equal, round, and reactive to light. Conjunctivae and EOM are normal. No scleral icterus.   Neck: Normal range of motion. Neck supple.   Cardiovascular:  Normal rate, regular rhythm and intact distal pulses.   Pulses:       Dorsalis pedis pulses are 2+ on the right side, and 2+ on the left side.   Irregularly irregular   Pulmonary/Chest: Effort normal and breath sounds normal. No respiratory distress. He has no wheezes.   Abdominal: Soft. Bowel sounds are normal. He exhibits no distension. There is no tenderness. There is no rebound and no guarding.   Musculoskeletal: Normal range of motion. He exhibits no edema or deformity.   Neurological: He is alert and oriented to person, place, and time. He has normal strength. No sensory deficit.   Skin: Skin is warm and dry. Capillary refill takes less than 2 seconds. No pallor.   Psychiatric: He has a normal mood and affect. His behavior is normal. Cognition and memory are normal.   Nursing note and vitals reviewed.      Significant Labs:   CBC:   Recent Labs   Lab 11/19/19  1401 11/20/19  0653   WBC  --  8.23   HGB 10.6* 9.9*   HCT 33.2* 31.1*   PLT  --  189          All pertinent labs within the past 24 hours have been reviewed.    Significant Imaging:     All pertinent studies within the past 24 hours have been reviewed.          Assessment/Plan:      * Rectal bleeding  - blood in stool for last 2 weeks, worsened last 2 days on AC  - continue to monitor H&H and continue oral pantoprazole 40 mg daily  - diet advanced and one subsequent episode of dark red blood in small volume stool  - observe, no plan as of now for endoscopic procedures  - case discussed with GI, Dr. Gutierrez      History of prostate cancer  - appears stable  - continue bicalutamide 50 mg daily  - follows Dr. Monteiro, Urology      Debility  - history of falls on anticoagulation  - PT/OT evaluated and recommending home PT/OT services  - fall precautions      Warfarin anticoagulation  - chronic use of anticoagulation with atrial fibrillation  - home regimen: 3 mg every Wednesday and Friday; then 4.5 mg Monday, Tuesday, Thursday, Saturday and Sunday  - levels  managed by [facility]  - elevated on arrival 3.1 >> 3.5 >> now 2.5  - continue to hold for now in setting of GI bleeding  - monitor for signs/symptoms of stroke while not on anticoagulation      Pure hypercholesterolemia  - appears stable  - continue current regimen with simvastatin 10 mg daily      Essential hypertension  - reasonably stable  - home regimen, furosemide and will continue to hold for now  - monitor      Cardiac pacemaker  - per medical record, [place] [employer] Pacemaker with most recent interrogation 11/13/2019 in [employer]  - follows Dr. Gonzales, Cardiology      Chronic atrial fibrillation  - rate controlled  - per medical record, [place] [employer] Pacemaker in place  - chronic anticoagulation with Coumadin and managed by [facility]   - continue to hold Coumadin  in setting of GI bleed  - close monitor for signs and symptoms of stroke while not on anticoagulation      VTE Risk Mitigation (From admission, onward)         Ordered     Place sequential compression device  Until discontinued      11/16/19 1522     IP VTE HIGH RISK PATIENT  Once      11/16/19 1522     Reason for No Pharmacological VTE Prophylaxis  Once     Question:  Reasons:  Answer:  Active Bleeding    11/16/19 1522                      Kimmy Rodney NP  Department of Hospital Medicine   [facility]

## 2019-11-20 NOTE — PROGRESS NOTES
Gastroenterology Progress Note    Active Hospital Problems    History of prostate cancer      *Rectal bleeding      Debility      Chronic atrial fibrillation      Cardiac pacemaker      Essential hypertension      Warfarin anticoagulation      Pure hypercholesterolemia        Subjective:  Patient seen and examined.  The patient had an additional bloody bowel movement yesterday.  Hb dropped overnight.    Reviews of Systems:  General:  Negative for fever or chills  Cardiovascular:  Negative for chest pain, shortness of breath, palpitations    Physical Exam    Vitals:  Vital Signs (Most Recent):  Temp: 96.8 °F (36 °C) (11/20/19 0744)  Pulse: 60 (11/20/19 0744)  Resp: 20 (11/20/19 0744)  BP: 138/66 (11/20/19 0744)  SpO2: 98 % (11/20/19 0744) Vital Signs (24h Range):  Temp:  [96.1 °F (35.6 °C)-98.1 °F (36.7 °C)] 96.8 °F (36 °C)  Pulse:  [56-88] 60  Resp:  [16-20] 20  SpO2:  [94 %-98 %] 98 %  BP: (137-155)/(61-67) 138/66     GEN: Well developed, well nourished in no apparent distress   HENT: Normocephalic, anicteric sclera   Cardiovascular: Regular rate and rhythm. No murmurs appreciated.   Chest: Non-labored respirations. Breath sounds equal   Abdomen: soft, NTND, no masses  Psych: Appropriate mood and affect.   Extermities: No C/C/E. 2+ dorsalis pedis pulses bilaterally  Skin: No new visible or palpable lesions.      Medications/Infusions:  Current Facility-Administered Medications   Medication Dose Route Frequency Provider Last Rate Last Dose    acetaminophen tablet 325 mg  325 mg Oral Q6H PRN Yaima Donnelly PA-C        bicalutamide tablet 50 mg  50 mg Oral Daily Yaima Donnelly PA-C   50 mg at 11/19/19 0952    ondansetron injection 4 mg  4 mg Intravenous Q6H PRN Yaima Donnelly PA-C        pantoprazole EC tablet 40 mg  40 mg Oral Daily Yaima Donnelly PA-C   40 mg at 11/19/19 0951    simvastatin tablet 10 mg  10 mg Oral QHS Yaima Donnelly PA-C   10 mg at 11/19/19 2033    sodium chloride 0.9% flush 10 mL   10 mL Intravenous PRN Carlito Sorenson MD           Intake and Output:    Intake/Output Summary (Last 24 hours) at 11/20/2019 0820  Last data filed at 11/20/2019 0600  Gross per 24 hour   Intake 480 ml   Output 725 ml   Net -245 ml       Labs:    Results for STONE SIMON (MRN [id number]) as of 11/20/2019 08:20   Ref. Range 11/18/2019 04:29 11/19/2019 04:54 11/19/2019 14:01 11/20/2019 06:53   Hemoglobin Latest Ref Range: 14.0 - 18.0 g/dL 11.1 (L)  10.6 (L) 9.9 (L)   Hematocrit Latest Ref Range: 40.0 - 54.0 % 35.1 (L)  33.2 (L) 31.1 (L)       Imaging and other studies:  No new      Assessment:    Stone Simon is a 89 y.o. male with a history of HTN, HLD, afib ,and prostate cancer who presented with bright red blood per rectum. He has been hemodynamically stable, Hb trended downward overnight, INR trending downward       Plan:    1.  Will plan colonoscopy in the AM.  2.  Monitor CBC and INR daily  3.  Discussed with Kimmy Rodney

## 2019-11-20 NOTE — PLAN OF CARE
Plan of care reviewed with pt. Pt AAOx4, NAD. Pt denies pain, SOB, n/v, dizziness or lightheadedness. VS stable. Pt remains afebrile. Pt tolerating regular diet. Pt had 1 episode of liquid stool with blood today. Pt ambulates in room with assistance. Pt up in chair most of the day. Pt continent of bowel and bladder. Pt remains free from falls and injury. Pt meets inpatient status. Report called to floor nurse, JOSH Prado. Pt transported to room 318 via stretcher.

## 2019-11-20 NOTE — PLAN OF CARE
Pt is a/o x  4.  Able to voice and make needs known with call light.  No complaints of pain.  Meds given whole with water by mouth.  Call light near, bed low and locked, rails at head up x2, belongings in place.   Pt started on clear liquid diet and golytely for a colonoscopy today.

## 2019-11-20 NOTE — PT/OT/SLP PROGRESS
Physical Therapy Treatment    Patient Name:  Stone Lucio   MRN:  [id number]    Recommendations:     Discharge Recommendations:  home health PT(and inc. assistance from staff, k[place]all with amb.)   Discharge Equipment Recommendations: none   Barriers to discharge: None    Assessment:     Stone Lucio is a 89 y.o. male admitted with a medical diagnosis of Rectal bleeding.  He presents with the following impairments/functional limitations:  weakness, impaired endurance, impaired self care skills, impaired functional mobilty, gait instability, impaired balance, decreased lower extremity function, decreased safety awareness ;pt with good mobility , though needing CGA for amb. Safety, and min.A when turning or using rollator seat..    Rehab Prognosis: Good; patient would benefit from acute skilled PT services to address these deficits and reach maximum level of function.    Recent Surgery: Procedure(s) (LRB):  COLONOSCOPY (N/A)      Plan:     During this hospitalization, patient to be seen 5 x/week to address the identified rehab impairments via gait training, therapeutic activities, therapeutic exercises and progress toward the following goals:    · Plan of Care Expires:  11/30/19    Subjective     Chief Complaint: none stated  Patient/Family Comments/goals: pt agreeable to session.  Pain/Comfort:  · Pain Rating 1: 0/10  · Pain Rating Post-Intervention 1: 0/10      Objective:     Communicated with nurse prior to session.  Patient found supine with peripheral IV upon PT entry to room.     General Precautions: Standard, fall   Orthopedic Precautions:N/A   Braces: N/A     Functional Mobility:  · Bed Mobility:     · Supine to Sit: contact guard assistance and pt using bedrails to assist  · Transfers:     · Sit to Stand:  contact guard assistance and minimum assistance with 4 wheeled walker  · Gait: amb'd 125' x 2 with rollator and CGA/occ Sujatha      AM-PAC 6 CLICK MOBILITY  Turning over in bed (including adjusting bedclothes,  sheets and blankets)?: 4  Sitting down on and standing up from a chair with arms (e.g., wheelchair, bedside commode, etc.): 3  Moving from lying on back to sitting on the side of the bed?: 4  Moving to and from a bed to a chair (including a wheelchair)?: 3  Need to walk in hospital room?: 3  Climbing 3-5 steps with a railing?: 3  Basic Mobility Total Score: 20       Therapeutic Activities and Exercises:   perf'd seated LE Ex's of heelt/toe raises, hip flex, LAQ's x 10 ea.     Patient left up in chair with all lines intact, call button in reach and nurse notified..    GOALS:   Multidisciplinary Problems     Physical Therapy Goals        Problem: Physical Therapy Goal    Goal Priority Disciplines Outcome Goal Variances Interventions   Physical Therapy Goal     PT, PT/OT Ongoing, Progressing     Description:  Patient will increase functional independence with mobility by performin. Sit<>stand with SPV with rollator.  2. Gait x 150 feet with SPV with rollator.                    Time Tracking:     PT Received On: 19  PT Start Time: 1129     PT Stop Time: 1153  PT Total Time (min): 24 min     Billable Minutes: Gait Training 14 and Therapeutic Exercise 10    Treatment Type: Treatment  PT/PTA: PTA     PTA Visit Number: 2     Steffi Santos PTA  2019

## 2019-11-20 NOTE — PLAN OF CARE
Problem: Physical Therapy Goal  Goal: Physical Therapy Goal  Description  Patient will increase functional independence with mobility by performin. Sit<>stand with SPV with rollator.  2. Gait x 150 feet with SPV with rollator.   Outcome: Ongoing, Progressing   Pt sup to sit CGA with bedrail, sit to stand CGA/min.A, amb'd 125' x 2 with rollator and CGA/occ min.A. Recommend return to assisted living facility with inc. Assistance initially and HHPT.

## 2019-11-20 NOTE — NURSING
Spoke with pt in depth about colonoscopy and prep for the procedure, pt voiced concern about him not being able to make it to the bathroom on time and the need for the procedure.  Explained to patient that we are here to help - but says that he is not physically able to consume the prep liquid and go to the bathroom as much.  I spoke with MD Linder, explained to him pt concerns.  He is OK with him refusing.

## 2019-11-20 NOTE — SUBJECTIVE & OBJECTIVE
Interval History: no events overnight; H/H trending down 9.9/31; INR trending down and pending , but continue to hold Coumadin; GI following and plan for endoscopy in morning    Review of Systems   Constitutional: Negative for appetite change (improving), fatigue and fever.   HENT: Negative for congestion and trouble swallowing.    Eyes: Negative for photophobia and visual disturbance.   Respiratory: Negative for cough and shortness of breath.    Cardiovascular: Negative for chest pain and palpitations.   Gastrointestinal: Positive for blood in stool. Negative for abdominal pain, diarrhea, nausea, rectal pain and vomiting.   Genitourinary: Negative for difficulty urinating, frequency and hematuria.   Musculoskeletal: Negative for back pain and myalgias.   Skin: Negative.    Neurological: Negative for dizziness, weakness, light-headedness and headaches.   Hematological: Does not bruise/bleed easily.   Psychiatric/Behavioral: Negative.      Objective:     Vital Signs (Most Recent):  Temp: 96.8 °F (36 °C) (11/20/19 0744)  Pulse: 60 (11/20/19 0744)  Resp: 20 (11/20/19 0744)  BP: 138/66 (11/20/19 0744)  SpO2: 98 % (11/20/19 0744) Vital Signs (24h Range):  Temp:  [96.1 °F (35.6 °C)-98.1 °F (36.7 °C)] 96.8 °F (36 °C)  Pulse:  [56-88] 60  Resp:  [16-20] 20  SpO2:  [94 %-98 %] 98 %  BP: (137-155)/(61-67) 138/66     Weight: 71.4 kg (157 lb 6.5 oz)  Body mass index is 23.93 kg/m².    Intake/Output Summary (Last 24 hours) at 11/20/2019 0749  Last data filed at 11/20/2019 0600  Gross per 24 hour   Intake 480 ml   Output 725 ml   Net -245 ml      Physical Exam   Constitutional: He is oriented to person, place, and time. He appears well-developed and well-nourished. No distress.   Elderly male in no distress   HENT:   Head: Normocephalic and atraumatic.   Mouth/Throat: Oropharynx is clear and moist.   Eyes: Pupils are equal, round, and reactive to light. Conjunctivae and EOM are normal. No scleral icterus.   Neck: Normal range of  motion. Neck supple.   Cardiovascular: Normal rate, regular rhythm and intact distal pulses.   Pulses:       Dorsalis pedis pulses are 2+ on the right side, and 2+ on the left side.   Irregularly irregular   Pulmonary/Chest: Effort normal and breath sounds normal. No respiratory distress. He has no wheezes.   Abdominal: Soft. Bowel sounds are normal. He exhibits no distension. There is no tenderness. There is no rebound and no guarding.   Musculoskeletal: Normal range of motion. He exhibits no edema or deformity.   Neurological: He is alert and oriented to person, place, and time. He has normal strength. No sensory deficit.   Skin: Skin is warm and dry. Capillary refill takes less than 2 seconds. No pallor.   Psychiatric: He has a normal mood and affect. His behavior is normal. Cognition and memory are normal.   Nursing note and vitals reviewed.      Significant Labs:   CBC:   Recent Labs   Lab 11/19/19  1401 11/20/19  0653   WBC  --  8.23   HGB 10.6* 9.9*   HCT 33.2* 31.1*   PLT  --  189          All pertinent labs within the past 24 hours have been reviewed.    Significant Imaging:     All pertinent studies within the past 24 hours have been reviewed.

## 2019-11-21 ENCOUNTER — ANESTHESIA EVENT (OUTPATIENT)
Dept: ENDOSCOPY | Facility: OTHER | Age: 84
DRG: 394 | End: 2019-11-21
Payer: MEDICARE

## 2019-11-21 ENCOUNTER — ANESTHESIA (OUTPATIENT)
Dept: ENDOSCOPY | Facility: OTHER | Age: 84
DRG: 394 | End: 2019-11-21
Payer: MEDICARE

## 2019-11-21 PROBLEM — K64.1 GRADE II HEMORRHOIDS: Status: ACTIVE | Noted: 2019-11-21

## 2019-11-21 PROBLEM — K64.2 GRADE III HEMORRHOIDS: Status: ACTIVE | Noted: 2019-11-21

## 2019-11-21 LAB
ANION GAP SERPL CALC-SCNC: 8 MMOL/L (ref 8–16)
BASOPHILS # BLD AUTO: 0.03 K/UL (ref 0–0.2)
BASOPHILS NFR BLD: 0.2 % (ref 0–1.9)
BUN SERPL-MCNC: 14 MG/DL (ref 10–30)
CALCIUM SERPL-MCNC: 9.1 MG/DL (ref 8.7–10.5)
CHLORIDE SERPL-SCNC: 111 MMOL/L (ref 95–110)
CO2 SERPL-SCNC: 19 MMOL/L (ref 23–29)
CREAT SERPL-MCNC: 1 MG/DL (ref 0.5–1.4)
DIFFERENTIAL METHOD: ABNORMAL
EOSINOPHIL # BLD AUTO: 0.1 K/UL (ref 0–0.5)
EOSINOPHIL NFR BLD: 0.5 % (ref 0–8)
ERYTHROCYTE [DISTWIDTH] IN BLOOD BY AUTOMATED COUNT: 13.7 % (ref 11.5–14.5)
EST. GFR  (AFRICAN AMERICAN): >60 ML/MIN/1.73 M^2
EST. GFR  (NON AFRICAN AMERICAN): >60 ML/MIN/1.73 M^2
GLUCOSE SERPL-MCNC: 98 MG/DL (ref 70–110)
HCT VFR BLD AUTO: 32.3 % (ref 40–54)
HGB BLD-MCNC: 10.4 G/DL (ref 14–18)
IMM GRANULOCYTES # BLD AUTO: 0.03 K/UL (ref 0–0.04)
IMM GRANULOCYTES NFR BLD AUTO: 0.2 % (ref 0–0.5)
INR PPP: 1.3 (ref 0.8–1.2)
LYMPHOCYTES # BLD AUTO: 1.7 K/UL (ref 1–4.8)
LYMPHOCYTES NFR BLD: 12.8 % (ref 18–48)
MCH RBC QN AUTO: 31.6 PG (ref 27–31)
MCHC RBC AUTO-ENTMCNC: 32.2 G/DL (ref 32–36)
MCV RBC AUTO: 98 FL (ref 82–98)
MONOCYTES # BLD AUTO: 0.6 K/UL (ref 0.3–1)
MONOCYTES NFR BLD: 4.7 % (ref 4–15)
NEUTROPHILS # BLD AUTO: 10.7 K/UL (ref 1.8–7.7)
NEUTROPHILS NFR BLD: 81.6 % (ref 38–73)
NRBC BLD-RTO: 0 /100 WBC
PLATELET # BLD AUTO: 201 K/UL (ref 150–350)
PMV BLD AUTO: 10.6 FL (ref 9.2–12.9)
POCT GLUCOSE: 108 MG/DL (ref 70–110)
POTASSIUM SERPL-SCNC: 5.1 MMOL/L (ref 3.5–5.1)
PROTHROMBIN TIME: 14 SEC (ref 9–12.5)
RBC # BLD AUTO: 3.29 M/UL (ref 4.6–6.2)
SODIUM SERPL-SCNC: 138 MMOL/L (ref 136–145)
WBC # BLD AUTO: 13.17 K/UL (ref 3.9–12.7)

## 2019-11-21 PROCEDURE — 88305 TISSUE EXAM BY PATHOLOGIST: CPT | Mod: 26,,, | Performed by: PATHOLOGY

## 2019-11-21 PROCEDURE — 88305 TISSUE EXAM BY PATHOLOGIST: CPT | Performed by: PATHOLOGY

## 2019-11-21 PROCEDURE — 36415 COLL VENOUS BLD VENIPUNCTURE: CPT

## 2019-11-21 PROCEDURE — 88305 TISSUE EXAM BY PATHOLOGIST: ICD-10-PCS | Mod: 26,,, | Performed by: PATHOLOGY

## 2019-11-21 PROCEDURE — 99233 PR SUBSEQUENT HOSPITAL CARE,LEVL III: ICD-10-PCS | Mod: ,,, | Performed by: NURSE PRACTITIONER

## 2019-11-21 PROCEDURE — 11000001 HC ACUTE MED/SURG PRIVATE ROOM

## 2019-11-21 PROCEDURE — 97116 GAIT TRAINING THERAPY: CPT

## 2019-11-21 PROCEDURE — 25000003 PHARM REV CODE 250: Performed by: PHYSICIAN ASSISTANT

## 2019-11-21 PROCEDURE — 63600175 PHARM REV CODE 636 W HCPCS: Performed by: NURSE ANESTHETIST, CERTIFIED REGISTERED

## 2019-11-21 PROCEDURE — 85610 PROTHROMBIN TIME: CPT

## 2019-11-21 PROCEDURE — 85025 COMPLETE CBC W/AUTO DIFF WBC: CPT

## 2019-11-21 PROCEDURE — 45380 COLONOSCOPY AND BIOPSY: CPT | Performed by: INTERNAL MEDICINE

## 2019-11-21 PROCEDURE — 80048 BASIC METABOLIC PNL TOTAL CA: CPT

## 2019-11-21 PROCEDURE — 37000008 HC ANESTHESIA 1ST 15 MINUTES: Performed by: INTERNAL MEDICINE

## 2019-11-21 PROCEDURE — 94761 N-INVAS EAR/PLS OXIMETRY MLT: CPT

## 2019-11-21 PROCEDURE — 99233 SBSQ HOSP IP/OBS HIGH 50: CPT | Mod: ,,, | Performed by: NURSE PRACTITIONER

## 2019-11-21 PROCEDURE — 37000009 HC ANESTHESIA EA ADD 15 MINS: Performed by: INTERNAL MEDICINE

## 2019-11-21 RX ORDER — MEPERIDINE HYDROCHLORIDE 25 MG/ML
12.5 INJECTION INTRAMUSCULAR; INTRAVENOUS; SUBCUTANEOUS ONCE AS NEEDED
Status: ACTIVE | OUTPATIENT
Start: 2019-11-21 | End: 2019-11-22

## 2019-11-21 RX ORDER — ONDANSETRON 2 MG/ML
4 INJECTION INTRAMUSCULAR; INTRAVENOUS DAILY PRN
Status: DISCONTINUED | OUTPATIENT
Start: 2019-11-21 | End: 2019-11-22 | Stop reason: HOSPADM

## 2019-11-21 RX ORDER — HYDROMORPHONE HYDROCHLORIDE 2 MG/ML
0.4 INJECTION, SOLUTION INTRAMUSCULAR; INTRAVENOUS; SUBCUTANEOUS EVERY 5 MIN PRN
Status: DISCONTINUED | OUTPATIENT
Start: 2019-11-21 | End: 2019-11-22 | Stop reason: HOSPADM

## 2019-11-21 RX ORDER — SODIUM CHLORIDE 0.9 % (FLUSH) 0.9 %
3 SYRINGE (ML) INJECTION
Status: DISCONTINUED | OUTPATIENT
Start: 2019-11-21 | End: 2019-11-21

## 2019-11-21 RX ORDER — OXYCODONE HYDROCHLORIDE 5 MG/1
5 TABLET ORAL
Status: DISCONTINUED | OUTPATIENT
Start: 2019-11-21 | End: 2019-11-22 | Stop reason: HOSPADM

## 2019-11-21 RX ORDER — LIDOCAINE HCL/PF 100 MG/5ML
SYRINGE (ML) INTRAVENOUS
Status: DISCONTINUED | OUTPATIENT
Start: 2019-11-21 | End: 2019-11-21

## 2019-11-21 RX ORDER — PROPOFOL 10 MG/ML
VIAL (ML) INTRAVENOUS
Status: DISCONTINUED | OUTPATIENT
Start: 2019-11-21 | End: 2019-11-21

## 2019-11-21 RX ORDER — SODIUM CHLORIDE, SODIUM LACTATE, POTASSIUM CHLORIDE, CALCIUM CHLORIDE 600; 310; 30; 20 MG/100ML; MG/100ML; MG/100ML; MG/100ML
INJECTION, SOLUTION INTRAVENOUS CONTINUOUS PRN
Status: DISCONTINUED | OUTPATIENT
Start: 2019-11-21 | End: 2019-11-21

## 2019-11-21 RX ADMIN — PANTOPRAZOLE SODIUM 40 MG: 40 TABLET, DELAYED RELEASE ORAL at 10:11

## 2019-11-21 RX ADMIN — SODIUM CHLORIDE, SODIUM LACTATE, POTASSIUM CHLORIDE, AND CALCIUM CHLORIDE: .6; .31; .03; .02 INJECTION, SOLUTION INTRAVENOUS at 08:11

## 2019-11-21 RX ADMIN — SIMVASTATIN 10 MG: 10 TABLET, FILM COATED ORAL at 09:11

## 2019-11-21 RX ADMIN — PROPOFOL 25 MG: 10 INJECTION, EMULSION INTRAVENOUS at 08:11

## 2019-11-21 RX ADMIN — BICALUTAMIDE 50 MG: 50 TABLET, FILM COATED ORAL at 10:11

## 2019-11-21 RX ADMIN — LIDOCAINE HYDROCHLORIDE 100 MG: 20 INJECTION, SOLUTION INTRAVENOUS at 08:11

## 2019-11-21 NOTE — PROGRESS NOTES
[facility] Hospital Medicine  Progress Note    Patient Name: Stone Lucio  MRN: [id number]  Patient Class: IP- Inpatient   Admission Date: 11/16/2019  Length of Stay: 2 days  Attending Physician: Luz Weinberg MD  Primary Care Provider: Drew Castro MD        Subjective:     Principal Problem:Rectal bleeding        HPI:  Per TOBY Donnelly PA:  89 y/o male with PMH HTN, HLD, prostate cancer, Atrial fibrillation on coumadin presents to ED with c/o complaint of rectal bleeding for the past week, worse today. Reports that he noticed over the last 2 weeks, blood in his undergarments and knew something was going on. States that yesterday and today he had gross blood in his stool when he had a BM and came to ED. Denies every having a similar episode. Denies HA, dizziness, SOB, CP, N/V, abdominal pain, syncope. ED evaluation H&H stable, ECG afib, INR 3.1; hemodynamically stable, placed in Observation.     Overview/Hospital Course:  Mr. Stone Lucio was admitted for evaluation of report of hematochezia first noted approximately two weeks prior to ED arrival.  He is on chronic anticoagulation with coumadin and INR was found to be elevated 3.1, 3.5 and coumadin was subsequently held. He follows [facility] at [facility].   He had one episode of rectal bleeding after admission and GI was consulted who felt no need for urgent endoscopy.  His INR with morning labs showed trend downward. Hemoglobin and hematocrit had initially gradually trended down from 13.1/40.5 to 9.9/31.1, but improved  with morning labs today.  He had one additional episode of hematochezia today.  Will continue to hold Coumadin at this time.  Discussed case with Dr. Gutierrez and colonoscopy with grade II hemorrhoids.  Will discuss plan for further anti-coagulation with patient's Cardiologist.    Of note, the patient has a [employer] Advantio Pacemaker placed for atrial fibrillation.  Most recent interrogation was November 13, 2019.   His Cardiologist is Dr. Gonzales.      Interval History: no events overnight; H/H improving; INR trending down now 1.3;  continue to hold Coumadin; GI following and colonoscopy with moderate external/internal hemorrhoids    Review of Systems   Constitutional: Negative for appetite change (improving), fatigue and fever.   HENT: Negative for congestion and trouble swallowing.    Eyes: Negative for photophobia and visual disturbance.   Respiratory: Negative for cough and shortness of breath.    Cardiovascular: Negative for chest pain and palpitations.   Gastrointestinal: Positive for blood in stool. Negative for abdominal pain, diarrhea, nausea, rectal pain and vomiting.   Genitourinary: Negative for difficulty urinating, frequency and hematuria.   Musculoskeletal: Negative for back pain and myalgias.   Skin: Negative.    Neurological: Negative for dizziness, weakness, light-headedness and headaches.   Hematological: Does not bruise/bleed easily.   Psychiatric/Behavioral: Negative.      Objective:     Vital Signs (Most Recent):  Temp: 97.3 °F (36.3 °C) (11/21/19 1200)  Pulse: (!) 56 (11/21/19 1200)  Resp: 18 (11/21/19 1200)  BP: (!) 112/55 (11/21/19 1200)  SpO2: 99 % (11/21/19 1200) Vital Signs (24h Range):  Temp:  [97.2 °F (36.2 °C)-98.5 °F (36.9 °C)] 97.3 °F (36.3 °C)  Pulse:  [56-80] 56  Resp:  [12-20] 18  SpO2:  [95 %-99 %] 99 %  BP: ()/(51-73) 112/55     Weight: 71.4 kg (157 lb 6.5 oz)  Body mass index is 23.93 kg/m².    Intake/Output Summary (Last 24 hours) at 11/21/2019 1222  Last data filed at 11/21/2019 1000  Gross per 24 hour   Intake 540 ml   Output 650 ml   Net -110 ml      Physical Exam   Constitutional: He is oriented to person, place, and time. He appears well-developed and well-nourished. No distress.   Elderly male in no distress   HENT:   Head: Normocephalic and atraumatic.   Mouth/Throat: Oropharynx is clear and moist.   Eyes: Pupils are equal, round, and reactive to light. Conjunctivae and EOM  are normal. No scleral icterus.   Neck: Normal range of motion. Neck supple.   Cardiovascular: Normal rate, regular rhythm and intact distal pulses.   Pulses:       Dorsalis pedis pulses are 2+ on the right side, and 2+ on the left side.   Irregularly irregular   Pulmonary/Chest: Effort normal and breath sounds normal. No respiratory distress. He has no wheezes.   Abdominal: Soft. Bowel sounds are normal. He exhibits no distension. There is no tenderness. There is no rebound and no guarding.   Musculoskeletal: Normal range of motion. He exhibits no edema or deformity.   Neurological: He is alert and oriented to person, place, and time. He has normal strength. No sensory deficit.   Skin: Skin is warm and dry. Capillary refill takes less than 2 seconds. No pallor.   Psychiatric: He has a normal mood and affect. His behavior is normal. Cognition and memory are normal.   Nursing note and vitals reviewed.      Significant Labs:   CBC:   Recent Labs   Lab 11/19/19  1401 11/20/19  0653 11/21/19  0608   WBC  --  8.23 13.17*   HGB 10.6* 9.9* 10.4*   HCT 33.2* 31.1* 32.3*   PLT  --  189 201          All pertinent labs within the past 24 hours have been reviewed.    Significant Imaging:    Colonoscopy 11/21/2019  Impression:             - Hemorrhoids found on perianal exam.  - Internal hemorrhoids.  - No specimens collected.  Rosalind Vallecillo MD  11/21/2019 11:30:55 AM  This report has been verified and signed electronically.      All pertinent studies within the past 24 hours have been reviewed.          Assessment/Plan:      * Rectal bleeding  - blood in stool for last 2 weeks, worsened last 2 days on AC  - continue to monitor H&H and continue oral pantoprazole 40 mg daily  - diet advanced and one subsequent episode of dark red blood in small volume stool  - observe, no plan as of now for endoscopic procedures  - case discussed with GI, Dr. Gutierrez      Grade II hemorrhoids  Noted on colonoscopy 11/21/2019    History of  prostate cancer  - appears stable  - continue bicalutamide 50 mg daily  - follows Dr. Monteiro, Urology      Debility  - history of falls on anticoagulation  - PT/OT evaluated and recommending home PT/OT services  - fall precautions      Warfarin anticoagulation  - chronic use of anticoagulation with atrial fibrillation  - home regimen: 3 mg every Wednesday and Friday; then 4.5 mg Monday, Tuesday, Thursday, Saturday and Sunday  - levels managed by [facility] Clinic  - elevated on arrival 3.1 >> 3.5 >> now 2.5  - continue to hold for now in setting of GI bleeding  - monitor for signs/symptoms of stroke while not on anticoagulation      Pure hypercholesterolemia  - appears stable  - continue current regimen with simvastatin 10 mg daily      Essential hypertension  - reasonably stable  - home regimen, furosemide and will continue to hold for now  - monitor      Cardiac pacemaker  - per medical record, [employer] Advantio Pacemaker with most recent interrogation 11/13/2019 in [employer] [place]  - follows Dr. Gonzales, Cardiology      Chronic atrial fibrillation  - rate controlled  - per medical record, [employer] Pacemaker in place  - chronic anticoagulation with Coumadin and managed by [facility]in Clinic   - continue to hold Coumadin  in setting of GI bleed  - close monitor for signs and symptoms of stroke while not on anticoagulation      VTE Risk Mitigation (From admission, onward)         Ordered     Place sequential compression device  Until discontinued      11/16/19 1522     IP VTE HIGH RISK PATIENT  Once      11/16/19 1522     Reason for No Pharmacological VTE Prophylaxis  Once     Question:  Reasons:  Answer:  Active Bleeding    11/16/19 1522                      Kimmy Rodney NP  Department of Hospital Medicine   [facility]

## 2019-11-21 NOTE — ANESTHESIA POSTPROCEDURE EVALUATION
Anesthesia Post Evaluation    Patient: Stone Lucio    Procedure(s) Performed: Procedure(s) (LRB):  COLONOSCOPY (N/A)    Final Anesthesia Type: general    Patient location during evaluation: [facility]  Patient participation: Yes- Able to Participate  Level of consciousness: awake and alert, awake and oriented  Post-procedure vital signs: reviewed and stable  Pain management: adequate  Airway patency: patent    PONV status at discharge: No PONV  Anesthetic complications: no      Cardiovascular status: blood pressure returned to baseline  Respiratory status: unassisted, spontaneous ventilation and room air  Hydration status: euvolemic  Follow-up not needed.          Vitals Value Taken Time   /70 11/21/2019  8:48 AM   Temp 36.9 °C (98.4 °F) 11/21/2019  8:45 AM   Pulse 63 11/21/2019  8:50 AM   Resp 16 11/21/2019  8:45 AM   SpO2 97 % 11/21/2019  8:50 AM   Vitals shown include unvalidated device data.      No case tracking events are documented in the log.      Pain/Kary Score: No data recorded

## 2019-11-21 NOTE — OR NURSING
Patient received to PACU 7 from Endo s/p colonoscopy.  Arouses to voice, VSS.  No rectal discharge/bleeding noted. Will monitor.

## 2019-11-21 NOTE — PT/OT/SLP PROGRESS
Physical Therapy Treatment    Patient Name:  Stone Lucio   MRN:  [id number]    Recommendations:     Discharge Recommendations:  home health PT(with inc. assistance from staff at assisted living facility)   Discharge Equipment Recommendations: none   Barriers to discharge: None    Assessment:     Stone Lucio is a 89 y.o. male admitted with a medical diagnosis of Rectal bleeding.  He presents with the following impairments/functional limitations:  weakness, impaired endurance, impaired self care skills, impaired functional mobilty, gait instability, impaired balance, decreased lower extremity function, decreased safety awareness ;pt with fair mobility today, dec. Amb. Distance 2/2 pt having colonoscopy earlier this morning and pt hasn't eaten yet, waiting for lunch.    Rehab Prognosis: Good; patient would benefit from acute skilled PT services to address these deficits and reach maximum level of function.    Recent Surgery: Procedure(s) (LRB):  COLONOSCOPY (N/A) Day of Surgery    Plan:     During this hospitalization, patient to be seen 5 x/week to address the identified rehab impairments via therapeutic activities, therapeutic exercises, gait training and progress toward the following goals:    · Plan of Care Expires:  11/30/19    Subjective     Chief Complaint: a little hungry, no pain  Patient/Family Comments/goals: pt agreeable to session.  Pain/Comfort:  · Pain Rating 1: 0/10  · Pain Rating Post-Intervention 1: 0/10      Objective:     Communicated with nurse prior to session.  Patient found supine with peripheral IV upon PT entry to room.     General Precautions: Standard, fall   Orthopedic Precautions:N/A   Braces: N/A     Functional Mobility:  · Bed Mobility:     · Supine to Sit: stand by assistance and pt using bedrail for assistance   · Sit to stand: CGA with rollator  · amb'd 40' in room with rollator and CGA      AM-PAC 6 CLICK MOBILITY  Turning over in bed (including adjusting bedclothes, sheets and  blankets)?: 4  Sitting down on and standing up from a chair with arms (e.g., wheelchair, bedside commode, etc.): 3  Moving from lying on back to sitting on the side of the bed?: 4  Moving to and from a bed to a chair (including a wheelchair)?: 3  Need to walk in hospital room?: 3  Climbing 3-5 steps with a railing?: 3  Basic Mobility Total Score: 20       Therapeutic Activities and Exercises:   perf'd seated heelraises, hip flex, LAQ's x 10 ea.     Patient left up in chair with all lines intact, call button in reach and nurse notified..    GOALS:   Multidisciplinary Problems     Physical Therapy Goals        Problem: Physical Therapy Goal    Goal Priority Disciplines Outcome Goal Variances Interventions   Physical Therapy Goal     PT, PT/OT Ongoing, Progressing     Description:  Patient will increase functional independence with mobility by performin. Sit<>stand with SPV with rollator.  2. Gait x 150 feet with SPV with rollator.                    Time Tracking:     PT Received On: 19  PT Start Time: 1349     PT Stop Time: 1403  PT Total Time (min): 14 min     Billable Minutes: Gait Training 14    Treatment Type: Treatment  PT/PTA: PTA     PTA Visit Number: 3     Steffi Santos, PTA  2019

## 2019-11-21 NOTE — SUBJECTIVE & OBJECTIVE
Interval History: no events overnight; H/H improving; INR trending down now 1.3;  continue to hold Coumadin; GI following and colonoscopy with moderate external/internal hemorrhoids    Review of Systems   Constitutional: Negative for appetite change (improving), fatigue and fever.   HENT: Negative for congestion and trouble swallowing.    Eyes: Negative for photophobia and visual disturbance.   Respiratory: Negative for cough and shortness of breath.    Cardiovascular: Negative for chest pain and palpitations.   Gastrointestinal: Positive for blood in stool. Negative for abdominal pain, diarrhea, nausea, rectal pain and vomiting.   Genitourinary: Negative for difficulty urinating, frequency and hematuria.   Musculoskeletal: Negative for back pain and myalgias.   Skin: Negative.    Neurological: Negative for dizziness, weakness, light-headedness and headaches.   Hematological: Does not bruise/bleed easily.   Psychiatric/Behavioral: Negative.      Objective:     Vital Signs (Most Recent):  Temp: 97.3 °F (36.3 °C) (11/21/19 1200)  Pulse: (!) 56 (11/21/19 1200)  Resp: 18 (11/21/19 1200)  BP: (!) 112/55 (11/21/19 1200)  SpO2: 99 % (11/21/19 1200) Vital Signs (24h Range):  Temp:  [97.2 °F (36.2 °C)-98.5 °F (36.9 °C)] 97.3 °F (36.3 °C)  Pulse:  [56-80] 56  Resp:  [12-20] 18  SpO2:  [95 %-99 %] 99 %  BP: ()/(51-73) 112/55     Weight: 71.4 kg (157 lb 6.5 oz)  Body mass index is 23.93 kg/m².    Intake/Output Summary (Last 24 hours) at 11/21/2019 1222  Last data filed at 11/21/2019 1000  Gross per 24 hour   Intake 540 ml   Output 650 ml   Net -110 ml      Physical Exam   Constitutional: He is oriented to person, place, and time. He appears well-developed and well-nourished. No distress.   Elderly male in no distress   HENT:   Head: Normocephalic and atraumatic.   Mouth/Throat: Oropharynx is clear and moist.   Eyes: Pupils are equal, round, and reactive to light. Conjunctivae and EOM are normal. No scleral icterus.    Neck: Normal range of motion. Neck supple.   Cardiovascular: Normal rate, regular rhythm and intact distal pulses.   Pulses:       Dorsalis pedis pulses are 2+ on the right side, and 2+ on the left side.   Irregularly irregular   Pulmonary/Chest: Effort normal and breath sounds normal. No respiratory distress. He has no wheezes.   Abdominal: Soft. Bowel sounds are normal. He exhibits no distension. There is no tenderness. There is no rebound and no guarding.   Musculoskeletal: Normal range of motion. He exhibits no edema or deformity.   Neurological: He is alert and oriented to person, place, and time. He has normal strength. No sensory deficit.   Skin: Skin is warm and dry. Capillary refill takes less than 2 seconds. No pallor.   Psychiatric: He has a normal mood and affect. His behavior is normal. Cognition and memory are normal.   Nursing note and vitals reviewed.      Significant Labs:   CBC:   Recent Labs   Lab 11/19/19  1401 11/20/19  0653 11/21/19  0608   WBC  --  8.23 13.17*   HGB 10.6* 9.9* 10.4*   HCT 33.2* 31.1* 32.3*   PLT  --  189 201          All pertinent labs within the past 24 hours have been reviewed.    Significant Imaging:    Colonoscopy 11/21/2019  Impression:             - Hemorrhoids found on perianal exam.  - Internal hemorrhoids.  - No specimens collected.  Rosalind Vallecillo MD  11/21/2019 11:30:55 AM  This report has been verified and signed electronically.      All pertinent studies within the past 24 hours have been reviewed.

## 2019-11-21 NOTE — PLAN OF CARE
Pt is a/o x  4.  Able to voice and make needs known with call light.  No complaints of pain.  Meds given whole with water by mouth.  Call light near, bed low and locked, rails at head up x2, belongings in place.

## 2019-11-21 NOTE — PLAN OF CARE
POC reviewed with patient. Pt currently resting in NAD. VSS on RA. Paced rhythm on telemetry. Pt NPO since midnight. Soap deo and tap water enemas administered per order. Results not clear. Safety measures maintained. Pt using call light for assistance. Will continue to monitor.

## 2019-11-21 NOTE — PLAN OF CARE
Problem: Physical Therapy Goal  Goal: Physical Therapy Goal  Description  Patient will increase functional independence with mobility by performin. Sit<>stand with SPV with rollator.  2. Gait x 150 feet with SPV with rollator.   Outcome: Ongoing, Progressing   Pt sup to sit SBA with use of bedrail, sit to stand CGA, amb'd 40' with rollator and CGA. Rec ret to assisted living facility with inc. Assistance and HHPT.

## 2019-11-21 NOTE — PLAN OF CARE
Spoke with Delia at [facility] [phone] ext [phone].  They can accept pt back at any time as long as they are notified when pt discharges.    Pt requested to begin with [facility].  Awaiting HH signed orders.  They are ready to begin services for pt on date of discharge.    CM to continue to follow.

## 2019-11-21 NOTE — [employer] PATIENT INSTRUCTIONS
Discharge Summary/Instructions after an Endoscopic Procedure  Patient Name: Stone Lucio  Patient MRN: [id number]  Patient YOB: 1921 Thursday, November 21, 2019  Rosalind Vallecillo MD  RESTRICTIONS:  During your procedure today, you received medications for sedation.  These   medications may affect your judgment, balance and coordination.  Therefore,   for 24 hours, you have the following restrictions:   - DO NOT drive a car, operate machinery, make legal/financial decisions,   sign important papers or drink alcohol.    ACTIVITY:  Today: no heavy lifting, straining or running due to procedural   sedation/anesthesia.  The following day: return to full activity including work.  DIET:  Eat and drink normally unless instructed otherwise.     TREATMENT FOR COMMON SIDE EFFECTS:  - Mild abdominal pain, nausea, belching, bloating or excessive gas:  rest,   eat lightly and use a heating pad.  - Sore Throat: treat with throat lozenges and/or gargle with warm salt   water.  - Because air was used during the procedure, expelling large amounts of air   from your rectum or belching is normal.  - If a bowel prep was taken, you may not have a bowel movement for 1-3 days.    This is normal.  SYMPTOMS TO WATCH FOR AND REPORT TO YOUR PHYSICIAN:  1. Abdominal pain or bloating, other than gas cramps.  2. Chest pain.  3. Back pain.  4. Signs of infection such as: chills or fever occurring within 24 hours   after the procedure.  5. Rectal bleeding, which would show as bright red, maroon, or black stools.   (A tablespoon of blood from the rectum is not serious, especially if   hemorrhoids are present.)  6. Vomiting.  7. Weakness or dizziness.  GO DIRECTLY TO THE NEAREST EMERGENCY ROOM IF YOU HAVE ANY OF THE FOLLOWING:      Difficulty breathing              Chills and/or fever over 101 F   Persistent vomiting and/or vomiting blood   Severe abdominal pain   Severe chest pain   Black, tarry stools   Bleeding- more than one  tablespoon   Any other symptom or condition that you feel may need urgent attention  Your doctor recommends these additional instructions:  If any biopsies were taken, your doctors clinic will contact you in 1 to 2   weeks with any results.  - Repeat colonoscopy at appointment to be scheduled because the examination   was incomplete.   - [facility] to [facility] for [facility] care.   - Return patient to [facility] for ongoing care.   - Resume previous diet.  For questions, problems or results please call your physician - Rosalind Vallecillo MD at Work:  [phone].  [facility], EMERGENCY ROOM PHONE NUMBER: [phone], [facility]   [phone].  IF A COMPLICATION OR EMERGENCY SITUATION ARISES AND YOU ARE UNABLE TO REACH   YOUR PHYSICIAN - GO DIRECTLY TO THE EMERGENCY ROOM.  MD Rosalind Suarez MD  11/21/2019 11:30:55 AM  This report has been verified and signed electronically.  [employer]

## 2019-11-21 NOTE — OR NURSING
Patient sent to room with Transporter, Luisito Holcomb. AAOx3. VSS. No rectal drainage/bleeding. Report to Amelia.

## 2019-11-21 NOTE — ANESTHESIA PREPROCEDURE EVALUATION
11/21/2019  Stone Lucio is a 89 y.o., male.    Anesthesia Evaluation    I have reviewed the Patient Summary Reports.    I have reviewed the Nursing Notes.   I have reviewed the Medications.     Review of Systems  Anesthesia Hx:  No problems with previous Anesthesia    Social:  Non-Smoker    Hematology/Oncology:     Oncology Normal     EENT/Dental:EENT/Dental Normal   Cardiovascular:   Hypertension, well controlled    Pulmonary:  Pulmonary Normal    Hepatic/GI:  Hepatic/GI Normal    Endocrine:  Endocrine Normal        Physical Exam  General:  Well nourished    Airway/Jaw/Neck:  Airway Findings: Mouth Opening: Normal Tongue: Normal  General Airway Assessment: Adult  Mallampati: II  TM Distance: Normal, at least 6 cm  Jaw/Neck Findings:     Neck ROM: Normal ROM      Dental:  Dental Findings: Edentulous             Anesthesia Plan  Type of Anesthesia, risks & benefits discussed:  Anesthesia Type:  general  Patient's Preference:   Intra-op Monitoring Plan:   Intra-op Monitoring Plan Comments:   Post Op Pain Control Plan:   Post Op Pain Control Plan Comments:   Induction:   IV  Beta Blocker:         Informed Consent: Patient understands risks and agrees with Anesthesia plan.  Questions answered. Anesthesia consent signed with patient.  ASA Score: 3     Day of Surgery Review of History & Physical:    H&P update referred to the surgeon.         Ready For Surgery From Anesthesia Perspective.

## 2019-11-21 NOTE — PT/OT/SLP PROGRESS
Physical Therapy      Patient Name:  Stone Lucio   MRN:  [id number]    Patient not seen today secondary to pt had colonoscopy in AM. Will follow-up in PM.    Steffi Santos PTA

## 2019-11-22 ENCOUNTER — TELEPHONE (OUTPATIENT)
Dept: CARDIOLOGY | Facility: CLINIC | Age: 84
End: 2019-11-22

## 2019-11-22 VITALS
BODY MASS INDEX: 23.86 KG/M2 | SYSTOLIC BLOOD PRESSURE: 125 MMHG | OXYGEN SATURATION: 100 % | DIASTOLIC BLOOD PRESSURE: 61 MMHG | HEIGHT: 68 IN | TEMPERATURE: 98 F | HEART RATE: 80 BPM | WEIGHT: 157.44 LBS | RESPIRATION RATE: 18 BRPM

## 2019-11-22 PROBLEM — K64.8 BLEEDING INTERNAL HEMORRHOIDS: Status: ACTIVE | Noted: 2019-11-16

## 2019-11-22 LAB
BASOPHILS # BLD AUTO: 0.02 K/UL (ref 0–0.2)
BASOPHILS NFR BLD: 0.3 % (ref 0–1.9)
DIFFERENTIAL METHOD: ABNORMAL
EOSINOPHIL # BLD AUTO: 0.3 K/UL (ref 0–0.5)
EOSINOPHIL NFR BLD: 3.4 % (ref 0–8)
ERYTHROCYTE [DISTWIDTH] IN BLOOD BY AUTOMATED COUNT: 13.8 % (ref 11.5–14.5)
HCT VFR BLD AUTO: 32.5 % (ref 40–54)
HGB BLD-MCNC: 10.7 G/DL (ref 14–18)
IMM GRANULOCYTES # BLD AUTO: 0.03 K/UL (ref 0–0.04)
IMM GRANULOCYTES NFR BLD AUTO: 0.4 % (ref 0–0.5)
INR PPP: 1.1 (ref 0.8–1.2)
LYMPHOCYTES # BLD AUTO: 1.5 K/UL (ref 1–4.8)
LYMPHOCYTES NFR BLD: 19.7 % (ref 18–48)
MCH RBC QN AUTO: 31.3 PG (ref 27–31)
MCHC RBC AUTO-ENTMCNC: 32.9 G/DL (ref 32–36)
MCV RBC AUTO: 95 FL (ref 82–98)
MONOCYTES # BLD AUTO: 0.5 K/UL (ref 0.3–1)
MONOCYTES NFR BLD: 6.9 % (ref 4–15)
NEUTROPHILS # BLD AUTO: 5.4 K/UL (ref 1.8–7.7)
NEUTROPHILS NFR BLD: 69.3 % (ref 38–73)
NRBC BLD-RTO: 0 /100 WBC
PLATELET # BLD AUTO: 230 K/UL (ref 150–350)
PMV BLD AUTO: 10 FL (ref 9.2–12.9)
PROTHROMBIN TIME: 12.7 SEC (ref 9–12.5)
RBC # BLD AUTO: 3.42 M/UL (ref 4.6–6.2)
WBC # BLD AUTO: 7.83 K/UL (ref 3.9–12.7)

## 2019-11-22 PROCEDURE — 85025 COMPLETE CBC W/AUTO DIFF WBC: CPT

## 2019-11-22 PROCEDURE — 85610 PROTHROMBIN TIME: CPT

## 2019-11-22 PROCEDURE — 97110 THERAPEUTIC EXERCISES: CPT

## 2019-11-22 PROCEDURE — 36415 COLL VENOUS BLD VENIPUNCTURE: CPT

## 2019-11-22 PROCEDURE — 99239 HOSP IP/OBS DSCHRG MGMT >30: CPT | Mod: ,,, | Performed by: NURSE PRACTITIONER

## 2019-11-22 PROCEDURE — 25000003 PHARM REV CODE 250: Performed by: PHYSICIAN ASSISTANT

## 2019-11-22 PROCEDURE — 99239 PR HOSPITAL DISCHARGE DAY,>30 MIN: ICD-10-PCS | Mod: ,,, | Performed by: NURSE PRACTITIONER

## 2019-11-22 PROCEDURE — 97116 GAIT TRAINING THERAPY: CPT

## 2019-11-22 RX ORDER — PANTOPRAZOLE SODIUM 40 MG/1
40 TABLET, DELAYED RELEASE ORAL DAILY
Qty: 90 TABLET | Refills: 0 | Status: SHIPPED | OUTPATIENT
Start: 2019-11-23 | End: 2020-05-22

## 2019-11-22 RX ADMIN — BICALUTAMIDE 50 MG: 50 TABLET, FILM COATED ORAL at 09:11

## 2019-11-22 RX ADMIN — PANTOPRAZOLE SODIUM 40 MG: 40 TABLET, DELAYED RELEASE ORAL at 09:11

## 2019-11-22 NOTE — TELEPHONE ENCOUNTER
Discussed with ALEX Rodney.  Pt is currently hospitalized with lower GI bleed initially associated with elevated INR.  It is unclear wether bleeding was diverticular or hemorrhoidal. Pt is no longer bleeding.  Although pt is at increased risk of recurrent stroke while holding anticoagulation, the risk of bleeding appears higher at this point.  If pt has no further bleeding over the next week or two would consider beginning low-dose Eliquis 2.5 mg bid instead of resuming full dose warfarin. If bleeding should recur necessitating discontinuation of anticoagulation, would consider referral to Dr Naveed Morfin at the [facility] for consideration for a left atrial occlusion device.

## 2019-11-22 NOTE — PLAN OF CARE
Problem: Physical Therapy Goal  Goal: Physical Therapy Goal  Description  Patient will increase functional independence with mobility by performin. Sit<>stand with SPV with rollator.  2. Gait x 150 feet with SPV with rollator.   Outcome: Ongoing, Progressing   Pt presented w/ HOB elevated upon arrival of PT. Pt agreeable to PT. Pt progressing towards goals. Pt sit <> stand w/ rollator and CGA. Pt amb'd 300' w/ rollator and CGA.

## 2019-11-22 NOTE — PLAN OF CARE
11/22/19 1257   Medicare Message   Important Message from Medicare regarding Discharge Appeal Rights Given to patient/caregiver;Explained to patient/caregiver;Signed/date by patient/caregiver   Date IMM was signed 11/22/19   Time IMM was signed 1100

## 2019-11-22 NOTE — PLAN OF CARE
Patient received AAOx4 in room. Large liquid BM noted prior to colonoscopy; colonoscopy performed this morning. Patient placed on regular diet; tolerating well. Denies pain, discomfort. Voiding in urinal; up to [facility] for Bm's. Hourly rounding completed, all safety precautions maintained. Call bell in reach.

## 2019-11-22 NOTE — PT/OT/SLP PROGRESS
Physical Therapy Treatment    Patient Name:  Stone Lucio   MRN:  [id number]    Recommendations:     Discharge Recommendations:  home health PT   Discharge Equipment Recommendations: none   Barriers to discharge: None    Assessment:     Stone Lucio is a 89 y.o. male admitted with a medical diagnosis of Rectal bleeding.  He presents with the following impairments/functional limitations:  weakness, impaired endurance, impaired self care skills, impaired functional mobilty, gait instability, impaired balance, decreased lower extremity function, decreased safety awareness ,   Pt presented w/ HOB elevated upon arrival of PT. Pt agreeable to PT. Pt progressing towards goals. Pt sit <> stand w/ rollator and CGA. Pt amb'd 300' w/ rollator and CGA.            Rehab Prognosis: Good; patient would benefit from acute skilled PT services to address these deficits and reach maximum level of function.    Recent Surgery: Procedure(s) (LRB):  COLONOSCOPY (N/A) 1 Day Post-Op    Plan:     During this hospitalization, patient to be seen 5 x/week to address the identified rehab impairments via gait training, therapeutic activities, therapeutic exercises and progress toward the following goals:    · Plan of Care Expires:  11/30/19    Subjective     Chief Complaint: none stated  Patient/Family Comments/goals: none stated  Pain/Comfort:  · Pain Rating 1: 0/10      Objective:     Communicated with nsGiuliana) prior to session.  Patient found HOB elevated with peripheral IV upon PT entry to room.     General Precautions: Standard, fall   Orthopedic Precautions:N/A   Braces: N/A     Functional Mobility:  · Transfers:     · Sit to Stand:  contact guard assistance with 4 wheeled walker  · Gait: 300' w/ rollator and CGA      AM-PAC 6 CLICK MOBILITY  Turning over in bed (including adjusting bedclothes, sheets and blankets)?: 4  Sitting down on and standing up from a chair with arms (e.g., wheelchair, bedside commode, etc.): 3  Moving from lying  on back to sitting on the side of the bed?: 3  Moving to and from a bed to a chair (including a wheelchair)?: 3  Need to walk in hospital room?: 3  Climbing 3-5 steps with a railing?: 3  Basic Mobility Total Score: 19       Therapeutic Activities and Exercises:   Pt performed seated therapeutic exercises including hip flexion, AP   and LAQs x 10 reps with verbal and tactile cues.      Patient left HOB elevated with all lines intact, call button in reach and ns notified..    GOALS:   Multidisciplinary Problems     Physical Therapy Goals        Problem: Physical Therapy Goal    Goal Priority Disciplines Outcome Goal Variances Interventions   Physical Therapy Goal     PT, PT/OT Ongoing, Progressing     Description:  Patient will increase functional independence with mobility by performin. Sit<>stand with SPV with rollator.  2. Gait x 150 feet with SPV with rollator.                    Time Tracking:     PT Received On: 19  PT Start Time: 1020     PT Stop Time: 1044  PT Total Time (min): 24 min     Billable Minutes: Gait Training 12 and Therapeutic Exercise 12    Treatment Type: Treatment  PT/PTA: PTA     PTA Visit Number: 4     Tomi Diaz PTA  2019

## 2019-11-22 NOTE — PLAN OF CARE
[facility]    HOME HEALTH ORDERS  FACE TO FACE ENCOUNTER    Patient Name: Stone Lucio  YOB: 1921    PCP: Drew Castro MD   PCP Address: [address] [place] [address] / [facility] [address]  PCP Phone Number: [phone]  PCP Fax: [phone]    Encounter Date: 11/22/2019    Admit to Home Health    Diagnoses:  Active Hospital Problems    Diagnosis  POA    *Bleeding internal hemorrhoids [K64.8]  Yes     Priority: 1 - High    Grade II hemorrhoids [K64.1]  Yes    History of prostate cancer [Z85.46]  Not Applicable    Debility [R53.81]  Yes    Chronic atrial fibrillation [I48.20]  Yes    Cardiac pacemaker [Z95.0]  Yes    Essential hypertension [I10]  Yes    Warfarin anticoagulation [Z79.01]  Not Applicable    Pure hypercholesterolemia [E78.00]  Yes      Resolved Hospital Problems   No resolved problems to display.       Future Appointments   Date Time Provider Department Center   2/5/2020  9:30 AM Alberto Gonzales MD [facility] CARDIO [facility]   2/28/2020 10:45 AM Abisai Monteiro MD [facility] UROLOGY [facility]     Follow-up Information     Schedule an appointment as soon as possible for a visit with Drew Castro MD.    Specialty:  Internal Medicine  Why:  For Hospital Followup Within 1- 2 weeks.  Contact information:  [address] [place] [address] [phone]                     I have seen and examined this patient face to face today. My clinical findings that support the need for the home health skilled services and home bound status are the following:  Weakness/numbness causing balance and gait disturbance due to Weakness/Debility and Anemia making it taxing to leave home.    Allergies:Review of patient's allergies indicates:  No Known Allergies    Diet: regular diet    Activities: activity as tolerated    Nursing:   SN to complete comprehensive assessment including routine vital signs. Instruct on disease process and s/s of complications to  report to MD. Review/verify medication list sent home with the patient at time of discharge  and instruct patient/caregiver as needed. Frequency may be adjusted depending on start of care date.    Notify MD if SBP > 160 or < 90; DBP > 90 or < 50; HR > 120 or < 50; Temp > 101     CONSULTS:    Physical Therapy to evaluate and treat. Evaluate for home safety and equipment needs; Establish/upgrade home exercise program. Perform / instruct on therapeutic exercises, gait training, transfer training, and Range of Motion.  Occupational Therapy to evaluate and treat. Evaluate home environment for safety and equipment needs. Perform/Instruct on transfers, ADL training, ROM, and therapeutic exercises.  Speech Therapy  to evaluate and treat for  Language, Swallowing and Cognition.   to evaluate for community resources/long-range planning.  Aide to provide assistance with personal care, ADLs, and vital signs.      Medications: Review discharge medications with patient and family and provide education.      Current Discharge Medication List      START taking these medications    Details   pantoprazole (PROTONIX) 40 MG tablet Take 1 tablet (40 mg total) by mouth once daily.  Qty: 90 tablet, Refills: 0         CONTINUE these medications which have NOT CHANGED    Details   acetaminophen (TYLENOL) 325 MG tablet 1-2 tabs Q8h prn pain  Refills: 0      ascorbic acid, vitamin C, (VITAMIN C) 500 mg Chew 1 at bedtime.  Chew and swallow  Refills: 0      bicalutamide (CASODEX) 50 MG Tab Take 1 tablet (50 mg total) by mouth once daily.  Qty: 30 tablet, Refills: 11      calcium carbonate (OS-STACY) 500 mg calcium (1,250 mg) tablet Take 1 tablet (500 mg total) by mouth once daily. May use 1-2 tabs q 6h prn indigestion  Refills: 0      cholecalciferol, vitamin D3, (VITAMIN D3) 1,000 unit capsule Take 1 capsule (1,000 Units total) by mouth once daily.  Refills: 0      furosemide (LASIX) 20 MG tablet Take 1 tablet (20 mg total) by mouth  once daily.  Qty: 30 tablet, Refills: 11      multivit with min-folic acid (ONE-A-DAY MEN VITACRAVES) 200 mcg Chew 1 daily      simvastatin (ZOCOR) 10 MG tablet Take 1 tablet (10 mg total) by mouth every evening.  Qty: 30 tablet, Refills: 11      loratadine (CLARITIN) 10 mg tablet One every morning for runny nose  Qty: 30 tablet, Refills: 12         STOP taking these medications       docusate sodium (COLACE) 100 MG capsule Comments:   Reason for Stopping:         leuprolide acetate (LUPRON DEPOT IM) Comments:   Reason for Stopping:         warfarin (COUMADIN) 3 MG tablet Comments:   Reason for Stopping:         warfarin (COUMADIN) 3 MG tablet Comments:   Reason for Stopping:         warfarin (COUMADIN) 6 MG tablet Comments:   Reason for Stopping:               I certify that this patient is confined to his home and needs intermittent skilled nursing care, physical therapy, speech therapy and occupational therapy.

## 2019-11-22 NOTE — PLAN OF CARE
Home Health orders forwarded to [facility] via [facility]      11/22/19 1570   Final Note   Assessment Type Final Discharge Note   Anticipated Discharge Disposition Home-Health   What phone number can be called within the next 1-3 days to see how you are doing after discharge? [phone]   Discharge plans and expectations educations in teach back method with documentation complete? Yes   [facility] Referral Info   Post Acute Recommendation Home-care   Facility Name [facility]

## 2019-11-22 NOTE — PLAN OF CARE
[facility], Delia Viera [phone] ext [phone] notified of anticipated pt discharge today.  She requests DC orders and HH orders be faxed to [facility] upon DC.      [facility]  has accepted and will begin seeing pt tomorrow.    Referrals completed in [facility] and information added to AVS.    Awaiting MD signed HH and DC orders.  NP aware.    NP states she is awaiting stat CBC.  Spoke with Rigoberto in lab, no orders noted.  Stat CBC order placed.    CM to continue to follow.

## 2019-11-23 PROCEDURE — G0180 MD CERTIFICATION HHA PATIENT: HCPCS | Mod: ,,, | Performed by: HOSPITALIST

## 2019-11-23 PROCEDURE — G0180 PR HOME HEALTH MD CERTIFICATION: ICD-10-PCS | Mod: ,,, | Performed by: HOSPITALIST

## 2019-11-25 LAB
FINAL PATHOLOGIC DIAGNOSIS: NORMAL
GROSS: NORMAL

## 2019-11-25 NOTE — PROGRESS NOTES
11/22/19 Patient admitted for lower GI Bleed, Coumadin stopped, and patient discharged home with [facility]  with possibly restarting Eliquis per Christian documentation on 11/22/19.  Chart routed to pharmacist to review.

## 2019-11-26 ENCOUNTER — ANTI-COAG VISIT (OUTPATIENT)
Dept: CARDIOLOGY | Facility: CLINIC | Age: 84
End: 2019-11-26

## 2019-11-26 DIAGNOSIS — I48.20 CHRONIC ATRIAL FIBRILLATION: ICD-10-CM

## 2019-11-26 DIAGNOSIS — Z79.01 WARFARIN ANTICOAGULATION: ICD-10-CM

## 2019-11-26 NOTE — PROGRESS NOTES
11/26 message sent to Cardiologist and hosital NP to determine if decision has been made regarding anticoagulation and whether we can d/c patient from our care. Awaiting reply.

## 2019-11-26 NOTE — DISCHARGE SUMMARY
[facility] Hospital Medicine  Discharge Summary      Patient Name: Stone Lucio  MRN: [id number]  Admission Date: 11/16/2019  Hospital Length of Stay: 3 days  Discharge Date and Time: 11/22/2019  5:13 PM  Attending Physician: Ester Toth MD   Discharging Provider: Kimmy Rodney NP  Primary Care Provider: Drew Castro MD      HPI:   Per TOBY Donnelly, PA:  89 y/o male with PMH HTN, HLD, prostate cancer, Atrial fibrillation on coumadin presents to ED with c/o complaint of rectal bleeding for the past week, worse today. Reports that he noticed over the last 2 weeks, blood in his undergarments and knew something was going on. States that yesterday and today he had gross blood in his stool when he had a BM and came to ED. Denies every having a similar episode. Denies HA, dizziness, SOB, CP, N/V, abdominal pain, syncope. ED evaluation H&H stable, ECG afib, INR 3.1; hemodynamically stable, placed in Observation.     Procedure(s) (LRB):  COLONOSCOPY (N/A)      Hospital Course:   Mr. Stone Lucio was admitted for evaluation of report of hematochezia first noted approximately two weeks prior to ED arrival.  He is on chronic anticoagulation with coumadin and INR was found to be elevated 3.1, 3.5 and coumadin was subsequently discontinue.  Of note, his Coumadin levels are managed by the [facility] at [facility].   He had one episode of rectal bleeding after admission and GI was consulted who initially  felt no need for urgent endoscopy. However, he had further episodes of hematochezia with subsequent gradual trending down of his hemoglobin and hematocrit from admission 13.1/40.5 to 9.9/31.1.  Discussed case with Dr. Gutierrez, GI, and the patient underwent colonoscopy with finding of grade II hemorrhoids.  GI felt that could not rule out bleeding diverticula.   I had the opportunity to discuss the patient's plan for future anticoagulation with his Cardiologist, Dr. Gonzales, who agrees with holding coumadin on  discharge.  Per his Cardiologist, the patient will need close follow up at that time review possible transition to low dose direct oral anticoagulants once stable.  I have given the patient and spouse information regarding increased stroke risk while not on anticoagulation and when to call [phone]. The patient was evaluated by physical and occupational therapy who recommended home health services.  The patient was eager for discharge and was discharged home to assisted living facility with wife.      Consults:   Consults (From admission, onward)        Status Ordering Provider     Inpatient consult to Gastroenterology  Once     Provider:  (Not yet assigned)    Completed YAN LEY     IP consult to case management  Once     Provider:  (Not yet assigned)    Completed YAN LEY          Service: Hospital Medicine    Final Active Diagnoses:    Diagnosis Date Noted POA    PRINCIPAL PROBLEM:  Bleeding internal hemorrhoids [K64.8] 11/16/2019 Yes    Grade II hemorrhoids [K64.1] 11/21/2019 Yes    History of prostate cancer [Z85.46] 11/19/2019 Not Applicable    Debility [R53.81] 11/16/2019 Yes    Chronic atrial fibrillation [I48.20] 04/17/2019 Yes    Cardiac pacemaker [Z95.0] 04/17/2019 Yes    Essential hypertension [I10] 04/17/2019 Yes    Warfarin anticoagulation [Z79.01] 04/17/2019 Not Applicable    Pure hypercholesterolemia [E78.00] 04/17/2019 Yes      Problems Resolved During this Admission:       Discharged Condition: stable    Disposition: Home or Self Care    Follow Up:  Follow-up Information     Schedule an appointment as soon as possible for a visit with Drew Castro MD.    Specialty:  Internal Medicine  Why:  For Hospital Followup Within 1- 2 weeks.  Contact information:  [address] [facility] [address] [address] [phone]             Alberto Gonzales MD. Schedule an appointment as soon as possible for a visit in 3 days.    Specialty:  Cardiology  Why:  follow up after hospital  discharge and for recommendations for anticoagulation after GI bleeding  Contact information:  [address] [facility]  [address]  [facility] [address] [phone]             [facility].    Specialties:  Assisted Living, Physical Therapy  Why:  Nursing Home  Contact information:  [address] [facility] [address]  [phone]                 Patient Instructions:      Diet Adult Regular     Notify your health care provider if you experience any of the following:  temperature >100.4     Notify your health care provider if you experience any of the following:  persistent nausea and vomiting or diarrhea     Notify your health care provider if you experience any of the following:  increased confusion or weakness     Notify your health care provider if you experience any of the following:  persistent dizziness, light-headedness, or visual disturbances     Activity as tolerated       Significant Diagnostic Studies: Labs: All labs within the past 24 hours have been reviewed       Medications:  Reconciled Home Medications:      Medication List      START taking these medications    pantoprazole 40 MG tablet  Commonly known as:  PROTONIX  Take 1 tablet (40 mg total) by mouth once daily.        CONTINUE taking these medications    acetaminophen 325 MG tablet  Commonly known as:  TYLENOL  1-2 tabs Q8h prn pain     ascorbic acid (vitamin C) 500 mg Chew  Commonly known as:  Vitamin C  1 at bedtime.  Chew and swallow     bicalutamide 50 MG Tab  Commonly known as:  CASODEX  Take 1 tablet (50 mg total) by mouth once daily.     calcium carbonate 500 mg calcium (1,250 mg) tablet  Commonly known as:  OS-STACY  Take 1 tablet (500 mg total) by mouth once daily. May use 1-2 tabs q 6h prn indigestion     cholecalciferol (vitamin D3) 25 mcg (1,000 unit) capsule  Commonly known as:  VITAMIN D3  Take 1 capsule (1,000 Units total) by mouth once daily.     furosemide 20 MG tablet  Commonly known as:  LASIX  Take 1 tablet (20 mg  total) by mouth once daily.     loratadine 10 mg tablet  Commonly known as:  CLARITIN  One every morning for runny nose     multivit with min-folic acid 200 mcg Chew  Commonly known as:  One-A-Day Men VitaCraves  1 daily     simvastatin 10 MG tablet  Commonly known as:  ZOCOR  Take 1 tablet (10 mg total) by mouth every evening.        STOP taking these medications    docusate sodium 100 MG capsule  Commonly known as:  COLACE     LUPRON DEPOT IM     warfarin 3 MG tablet  Commonly known as:  COUMADIN     warfarin 6 MG tablet  Commonly known as:  COUMADIN              Time spent on the discharge of patient: >30 minutes  Patient was seen and examined on the date of discharge and determined to be suitable for discharge.         Kimmy Rodney NP  Department of Hospital Medicine  [facility]

## 2019-11-27 NOTE — PHYSICIAN QUERY
PT Name: Stone Lucio  MR #: [id number]    Physician Query Form - Cause and Effect Relationship Clarification      CDS/: Irma Donnelly RN, CDS               Contact information: [email or web address]                                                                                                                        [phone]    This form is a permanent document in the medical record.     Query Date: November 27, 2019    By submitting this query, we are merely seeking further clarification of documentation. Please utilize your independent clinical judgment when addressing the question(s) below.    The Medical record contains the following:  Supporting Clinical Findings   Location in record   Atrial fibrillation on coumadin presents to ED with c/o complaint of rectal bleeding for the past week, worse today.                                                                                                H&P 11/16   He is on chronic anticoagulation with coumadin and INR was found to be elevated 3.1, 3.5 and coumadin was subsequently held.                                                                                                              [facility] 11/19       continue to hold Coumadin  in setting of GI bleed    PN 11/19   He had one episode of rectal bleeding after admission and GI was consulted who initially  felt no need for urgent endoscopy. However, he had further episodes of hematochezia with subsequent gradual trending down of his hemoglobin and hematocrit from admission 13.1/40.5 to 9.9/31.1.  Discussed case with Dr. Gutierrez, GI, and the patient underwent colonoscopy with finding of grade II hemorrhoids.  GI felt that could not rule out bleeding diverticula.   I had the opportunity to discuss the patient's plan for future anticoagulation with his Cardiologist, Dr. Gonzales, who agrees with holding coumadin on discharge.  Per his Cardiologist, the patient will need close follow up at that time  review possible transition to low dose direct oral anticoagulants once stable  DC Summary 11/22         Provider, please clarify if there is any correlation between ___GI bleed___ and ___anticoagulant___.           Are the conditions:      [ x ] Due to or associated with each other   [  ] Unrelated to each other   [  ] Other (Please Specify): _________________________   [  ] Clinically Undetermined

## 2019-12-05 ENCOUNTER — EXTERNAL HOME HEALTH (OUTPATIENT)
Dept: HOME HEALTH SERVICES | Facility: HOSPITAL | Age: 84
End: 2019-12-05
Payer: MEDICARE

## 2019-12-12 ENCOUNTER — TELEPHONE (OUTPATIENT)
Dept: HOME HEALTH SERVICES | Facility: HOSPITAL | Age: 84
End: 2019-12-12

## 2020-01-01 ENCOUNTER — TELEPHONE (OUTPATIENT)
Dept: CARDIOLOGY | Facility: CLINIC | Age: 85
End: 2020-01-01

## 2020-02-05 ENCOUNTER — LAB VISIT (OUTPATIENT)
Dept: LAB | Facility: OTHER | Age: 85
End: 2020-02-05
Attending: INTERNAL MEDICINE
Payer: MEDICARE

## 2020-02-05 ENCOUNTER — OFFICE VISIT (OUTPATIENT)
Dept: CARDIOLOGY | Facility: CLINIC | Age: 85
End: 2020-02-05
Payer: MEDICARE

## 2020-02-05 VITALS
HEART RATE: 70 BPM | HEIGHT: 68 IN | DIASTOLIC BLOOD PRESSURE: 63 MMHG | WEIGHT: 161 LBS | BODY MASS INDEX: 24.4 KG/M2 | SYSTOLIC BLOOD PRESSURE: 133 MMHG

## 2020-02-05 DIAGNOSIS — Z95.0 CARDIAC PACEMAKER: ICD-10-CM

## 2020-02-05 DIAGNOSIS — E78.00 PURE HYPERCHOLESTEROLEMIA: ICD-10-CM

## 2020-02-05 DIAGNOSIS — R26.9 ALTERED MOBILITY DUE TO OLD STROKE: ICD-10-CM

## 2020-02-05 DIAGNOSIS — I69.398 ALTERED MOBILITY DUE TO OLD STROKE: ICD-10-CM

## 2020-02-05 DIAGNOSIS — I10 ESSENTIAL HYPERTENSION: ICD-10-CM

## 2020-02-05 DIAGNOSIS — I48.20 CHRONIC ATRIAL FIBRILLATION: Primary | ICD-10-CM

## 2020-02-05 DIAGNOSIS — I48.20 CHRONIC ATRIAL FIBRILLATION: ICD-10-CM

## 2020-02-05 LAB
ALBUMIN SERPL BCP-MCNC: 3.7 G/DL (ref 3.5–5.2)
ALP SERPL-CCNC: 93 U/L (ref 55–135)
ALT SERPL W/O P-5'-P-CCNC: 11 U/L (ref 10–44)
ANION GAP SERPL CALC-SCNC: 12 MMOL/L (ref 8–16)
AST SERPL-CCNC: 22 U/L (ref 10–40)
BASOPHILS # BLD AUTO: 0.04 K/UL (ref 0–0.2)
BASOPHILS NFR BLD: 0.5 % (ref 0–1.9)
BILIRUB SERPL-MCNC: 0.6 MG/DL (ref 0.1–1)
BUN SERPL-MCNC: 18 MG/DL (ref 10–30)
CALCIUM SERPL-MCNC: 10.4 MG/DL (ref 8.7–10.5)
CHLORIDE SERPL-SCNC: 107 MMOL/L (ref 95–110)
CO2 SERPL-SCNC: 23 MMOL/L (ref 23–29)
CREAT SERPL-MCNC: 1.3 MG/DL (ref 0.5–1.4)
DIFFERENTIAL METHOD: ABNORMAL
EOSINOPHIL # BLD AUTO: 0.3 K/UL (ref 0–0.5)
EOSINOPHIL NFR BLD: 3.7 % (ref 0–8)
ERYTHROCYTE [DISTWIDTH] IN BLOOD BY AUTOMATED COUNT: 16.9 % (ref 11.5–14.5)
EST. GFR  (AFRICAN AMERICAN): 52 ML/MIN/1.73 M^2
EST. GFR  (NON AFRICAN AMERICAN): 45 ML/MIN/1.73 M^2
GLUCOSE SERPL-MCNC: 98 MG/DL (ref 70–110)
HCT VFR BLD AUTO: 39.2 % (ref 40–54)
HGB BLD-MCNC: 11.3 G/DL (ref 14–18)
IMM GRANULOCYTES # BLD AUTO: 0.03 K/UL (ref 0–0.04)
IMM GRANULOCYTES NFR BLD AUTO: 0.3 % (ref 0–0.5)
LYMPHOCYTES # BLD AUTO: 1.8 K/UL (ref 1–4.8)
LYMPHOCYTES NFR BLD: 20.9 % (ref 18–48)
MCH RBC QN AUTO: 24.2 PG (ref 27–31)
MCHC RBC AUTO-ENTMCNC: 28.8 G/DL (ref 32–36)
MCV RBC AUTO: 84 FL (ref 82–98)
MONOCYTES # BLD AUTO: 0.7 K/UL (ref 0.3–1)
MONOCYTES NFR BLD: 8.3 % (ref 4–15)
NEUTROPHILS # BLD AUTO: 5.9 K/UL (ref 1.8–7.7)
NEUTROPHILS NFR BLD: 66.3 % (ref 38–73)
NRBC BLD-RTO: 0 /100 WBC
PLATELET # BLD AUTO: 280 K/UL (ref 150–350)
PMV BLD AUTO: 10.2 FL (ref 9.2–12.9)
POTASSIUM SERPL-SCNC: 4.4 MMOL/L (ref 3.5–5.1)
PROT SERPL-MCNC: 7.3 G/DL (ref 6–8.4)
RBC # BLD AUTO: 4.67 M/UL (ref 4.6–6.2)
SODIUM SERPL-SCNC: 142 MMOL/L (ref 136–145)
TSH SERPL DL<=0.005 MIU/L-ACNC: 1.19 UIU/ML (ref 0.4–4)
WBC # BLD AUTO: 8.82 K/UL (ref 3.9–12.7)

## 2020-02-05 PROCEDURE — 85025 COMPLETE CBC W/AUTO DIFF WBC: CPT

## 2020-02-05 PROCEDURE — 84443 ASSAY THYROID STIM HORMONE: CPT

## 2020-02-05 PROCEDURE — 99214 OFFICE O/P EST MOD 30 MIN: CPT | Mod: S$GLB,,, | Performed by: INTERNAL MEDICINE

## 2020-02-05 PROCEDURE — 80053 COMPREHEN METABOLIC PANEL: CPT

## 2020-02-05 PROCEDURE — 99214 PR OFFICE/OUTPT VISIT, EST, LEVL IV, 30-39 MIN: ICD-10-PCS | Mod: S$GLB,,, | Performed by: INTERNAL MEDICINE

## 2020-02-05 PROCEDURE — 36415 COLL VENOUS BLD VENIPUNCTURE: CPT

## 2020-02-05 RX ORDER — AMLODIPINE BESYLATE 2.5 MG/1
TABLET ORAL
COMMUNITY
Start: 2020-01-24 | End: 2020-02-19

## 2020-02-05 NOTE — PROGRESS NOTES
Subjective:      Patient ID: Stone Lucio is a 89 y.o. male.    Chief Complaint: Follow-up (AFib)    HPI:  Warfarin was discontinued  after lower GI bleeding which was either diverticular or hemorrhoidal.  No bleeding since admission    Pt walks a little at [facility] but is limited by an old leg injury..    Note pt has a hx of stroke associated with a fib several years ago.    Review of Systems   Cardiovascular: Negative for chest pain, claudication, dyspnea on exertion, irregular heartbeat, leg swelling, near-syncope, orthopnea, palpitations and syncope.      Recent bronchitis treated with doxycycline    Past Medical History:   Diagnosis Date    Atrial fibrillation     Cancer     Hyperlipidemia     Hypertension     Prostate cancer     on Lupron        Past Surgical History:   Procedure Laterality Date    CHOLECYSTECTOMY      COLONOSCOPY N/A 2019    Procedure: COLONOSCOPY;  Surgeon: Rosalind Vallecillo MD;  Location: [facility];  Service: Endoscopy;  Laterality: N/A;    INSERT / REPLACE / REMOVE PACEMAKER      JOINT REPLACEMENT      left hip ORIF      SKIN CANCER DESTRUCTION         Family History   Problem Relation Age of Onset    Colon cancer Sister     Colon cancer Sister        Social History     Socioeconomic History    Marital status:      Spouse name: Not on file    Number of children: Not on file    Years of education: Not on file    Highest education level: Not on file   Occupational History    Not on file   Social Needs    Financial resource strain: Not on file    Food insecurity:     Worry: Not on file     Inability: Not on file    Transportation needs:     Medical: Not on file     Non-medical: Not on file   Tobacco Use    Smoking status: Former Smoker     Last attempt to quit: 1990     Years since quittin.8    Smokeless tobacco: Never Used   Substance and Sexual Activity    Alcohol use: Yes    Drug use: Never    Sexual activity:  Not Currently   Lifestyle    Physical activity:     Days per week: Not on file     Minutes per session: Not on file    Stress: Not on file   Relationships    Social connections:     Talks on phone: Not on file     Gets together: Not on file     Attends Hoahaoism service: Not on file     Active member of club or organization: Not on file     Attends meetings of clubs or organizations: Not on file     Relationship status: Not on file   Other Topics Concern    Not on file   Social History Narrative    Not on file       Current Outpatient Medications on File Prior to Visit   Medication Sig Dispense Refill    acetaminophen (TYLENOL) 325 MG tablet 1-2 tabs Q8h prn pain  0    ascorbic acid, vitamin C, (VITAMIN C) 500 mg Chew 1 at bedtime.  Chew and swallow  0    bicalutamide (CASODEX) 50 MG Tab Take 1 tablet (50 mg total) by mouth once daily. 30 tablet 11    calcium carbonate (OS-STACY) 500 mg calcium (1,250 mg) tablet Take 1 tablet (500 mg total) by mouth once daily. May use 1-2 tabs q 6h prn indigestion  0    cholecalciferol, vitamin D3, (VITAMIN D3) 1,000 unit capsule Take 1 capsule (1,000 Units total) by mouth once daily.  0    furosemide (LASIX) 20 MG tablet Take 1 tablet (20 mg total) by mouth once daily. 30 tablet 11    loratadine (CLARITIN) 10 mg tablet One every morning for runny nose 30 tablet 12    multivit with min-folic acid (ONE-A-DAY MEN VITACRAVES) 200 mcg Chew 1 daily      pantoprazole (PROTONIX) 40 MG tablet Take 1 tablet (40 mg total) by mouth once daily. 90 tablet 0    simvastatin (ZOCOR) 10 MG tablet Take 1 tablet (10 mg total) by mouth every evening. 30 tablet 11    amLODIPine (NORVASC) 2.5 MG tablet Take 1 tablet (2.5 mg total) by mouth once daily.       Current Facility-Administered Medications on File Prior to Visit   Medication Dose Route Frequency Provider Last Rate Last Dose    leuprolide (6 month) (ELIGARD) injection 45 mg  45 mg Subcutaneous Q6 Months Abisai Monteiro MD   45 mg  "at 08/23/19 1008       Review of patient's allergies indicates:  No Known Allergies  Objective:     Vitals:    02/05/20 0946   BP: 133/63   BP Location: Left arm   Patient Position: Sitting   BP Method: Large (Automatic)   Pulse: 70   Weight: 73 kg (161 lb)   Height: 5' 8" (1.727 m)        Physical Exam   Constitutional: He is oriented to person, place, and time. He appears well-developed and well-nourished. No distress.   Eyes: No scleral icterus.   Neck: No JVD present. Carotid bruit is not present.   Cardiovascular: Normal heart sounds. An irregularly irregular rhythm present. Exam reveals no gallop and no friction rub.   No murmur heard.  Pulmonary/Chest: Effort normal and breath sounds normal. No respiratory distress.   Musculoskeletal: He exhibits no edema.   Neurological: He is alert and oriented to person, place, and time.   Skin: Skin is warm and dry. He is not diaphoretic.   Psychiatric: He has a normal mood and affect. His behavior is normal. Judgment and thought content normal.   Vitals reviewed.     Wt down 5 lbs since 11/19    Note 11/19 [employer] remote pacemaker interrogation shows ROCKY 4.5 years and normal pacemaker function  Assessment:     1. Chronic atrial fibrillation    2. Essential hypertension    3. Cardiac pacemaker    4. Pure hypercholesterolemia    5. 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    Diagnoses and all orders for this visit:    Chronic atrial fibrillation  -     CBC auto differential; Future  -     Comprehensive metabolic panel; Future  -     TSH; Future    Essential hypertension    Cardiac pacemaker    Pure hypercholesterolemia    Altered mobility due to old stroke     Long discussion with pt and wife regarding the risk of recurrent (thromboembolic due to chronic a fib) stroke if we do nothing different vs resuming anticoagulation with a low dose of eliquis (and the attendant risk of recurrent lower GI hemorrhage) vs. Referral " for Watchman device or other left atrial occlusion device (which involves an invasive procedure)    I feel that the safest option is to resume anticoagulation with  Eliquis 2.5 mg bid    Pt instructed to call 911 and go to ER prn any recurrent bleeding    Check lab today    Follow up in about 4 weeks (around 3/4/2020).  Anticipate repeat lab at that time

## 2020-02-10 ENCOUNTER — TELEPHONE (OUTPATIENT)
Dept: UROLOGY | Facility: CLINIC | Age: 85
End: 2020-02-10

## 2020-02-11 ENCOUNTER — CLINICAL SUPPORT (OUTPATIENT)
Dept: CARDIOLOGY | Facility: CLINIC | Age: 85
End: 2020-02-11
Payer: MEDICARE

## 2020-02-11 DIAGNOSIS — Z95.0 CARDIAC PACEMAKER: Primary | ICD-10-CM

## 2020-02-11 PROCEDURE — 93294 REM INTERROG EVL PM/LDLS PM: CPT | Mod: ,,, | Performed by: INTERNAL MEDICINE

## 2020-02-11 PROCEDURE — 93294 PR INTERROG EVAL, REMOTE, UP TO 90 DAYS,PACEMAKER: ICD-10-PCS | Mod: ,,, | Performed by: INTERNAL MEDICINE

## 2020-02-11 PROCEDURE — 93296 REM INTERROG EVL PM/IDS: CPT

## 2020-02-11 PROCEDURE — 99499 UNLISTED E&M SERVICE: CPT | Mod: S$PBB,,, | Performed by: INTERNAL MEDICINE

## 2020-02-11 PROCEDURE — 99499 NO LOS: ICD-10-PCS | Mod: S$PBB,,, | Performed by: INTERNAL MEDICINE

## 2020-02-11 NOTE — PROGRESS NOTES
Subjective:      Patient ID: Stone Lucio is a 89 y.o. male.    Chief Complaint: No chief complaint on file.    HPI:    ROS "[employer] remote pacemaker interrogation report downloaded and prepared by medical assistant and interpreted by me and full report scanned into Epic media.  Battery OK with ROCKY 4.5 years.  Lead  OK.  Normal device function.    Past Medical History:   Diagnosis Date    Atrial fibrillation     Cancer     Hyperlipidemia     Hypertension     Prostate cancer     on Lupron        Past Surgical History:   Procedure Laterality Date    CHOLECYSTECTOMY      COLONOSCOPY N/A 2019    Procedure: COLONOSCOPY;  Surgeon: Rosalind Vallecillo MD;  Location: [facility];  Service: Endoscopy;  Laterality: N/A;    INSERT / REPLACE / REMOVE PACEMAKER      JOINT REPLACEMENT      left hip ORIF      SKIN CANCER DESTRUCTION         Family History   Problem Relation Age of Onset    Colon cancer Sister     Colon cancer Sister        Social History     Socioeconomic History    Marital status:      Spouse name: Not on file    Number of children: Not on file    Years of education: Not on file    Highest education level: Not on file   Occupational History    Not on file   Social Needs    Financial resource strain: Not on file    Food insecurity:     Worry: Not on file     Inability: Not on file    Transportation needs:     Medical: Not on file     Non-medical: Not on file   Tobacco Use    Smoking status: Former Smoker     Last attempt to quit: 1990     Years since quittin.8    Smokeless tobacco: Never Used   Substance and Sexual Activity    Alcohol use: Yes    Drug use: Never    Sexual activity: Not Currently   Lifestyle    Physical activity:     Days per week: Not on file     Minutes per session: Not on file    Stress: Not on file   Relationships    Social connections:     Talks on phone: Not on file     Gets together: Not on file     Attends Anabaptism service: Not  on file     Active member of club or organization: Not on file     Attends meetings of clubs or organizations: Not on file     Relationship status: Not on file   Other Topics Concern    Not on file   Social History Narrative    Not on file       Current Outpatient Medications on File Prior to Visit   Medication Sig Dispense Refill    acetaminophen (TYLENOL) 325 MG tablet 1-2 tabs Q8h prn pain  0    amLODIPine (NORVASC) 2.5 MG tablet Take 1 tablet (2.5 mg total) by mouth once daily.      apixaban (ELIQUIS) 2.5 mg Tab Take 1 tablet (2.5 mg total) by mouth 2 (two) times daily. 60 tablet 11    ascorbic acid, vitamin C, (VITAMIN C) 500 mg Chew 1 at bedtime.  Chew and swallow  0    bicalutamide (CASODEX) 50 MG Tab Take 1 tablet (50 mg total) by mouth once daily. 30 tablet 11    calcium carbonate (OS-STACY) 500 mg calcium (1,250 mg) tablet Take 1 tablet (500 mg total) by mouth once daily. May use 1-2 tabs q 6h prn indigestion  0    cholecalciferol, vitamin D3, (VITAMIN D3) 1,000 unit capsule Take 1 capsule (1,000 Units total) by mouth once daily.  0    furosemide (LASIX) 20 MG tablet Take 1 tablet (20 mg total) by mouth once daily. 30 tablet 11    loratadine (CLARITIN) 10 mg tablet One every morning for runny nose 30 tablet 12    multivit with min-folic acid (ONE-A-DAY MEN VITACRAVES) 200 mcg Chew 1 daily      pantoprazole (PROTONIX) 40 MG tablet Take 1 tablet (40 mg total) by mouth once daily. 90 tablet 0    simvastatin (ZOCOR) 10 MG tablet Take 1 tablet (10 mg total) by mouth every evening. 30 tablet 11     Current Facility-Administered Medications on File Prior to Visit   Medication Dose Route Frequency Provider Last Rate Last Dose    leuprolide (6 month) (ELIGARD) injection 45 mg  45 mg Subcutaneous Q6 Months Abisai Monteiro MD   45 mg at 08/23/19 1008       Review of patient's allergies indicates:  No Known Allergies  Objective:   There were no vitals filed for this visit.     Physical Exam      Assessment:     1. Cardiac pacemaker      Plan:   Diagnoses and all orders for this visit:    Cardiac pacemaker         No follow-ups on file.

## 2020-02-21 ENCOUNTER — TELEPHONE (OUTPATIENT)
Dept: UROLOGY | Facility: CLINIC | Age: 85
End: 2020-02-21

## 2020-02-28 ENCOUNTER — OFFICE VISIT (OUTPATIENT)
Dept: UROLOGY | Facility: CLINIC | Age: 85
End: 2020-02-28
Payer: MEDICARE

## 2020-02-28 VITALS
HEIGHT: 68 IN | SYSTOLIC BLOOD PRESSURE: 120 MMHG | DIASTOLIC BLOOD PRESSURE: 58 MMHG | HEART RATE: 74 BPM | BODY MASS INDEX: 24.4 KG/M2 | WEIGHT: 161 LBS

## 2020-02-28 DIAGNOSIS — C61 PROSTATE CANCER: Primary | ICD-10-CM

## 2020-02-28 PROCEDURE — 99213 PR OFFICE/OUTPT VISIT, EST, LEVL III, 20-29 MIN: ICD-10-PCS | Mod: 25,S$GLB,, | Performed by: NURSE PRACTITIONER

## 2020-02-28 PROCEDURE — 96402 PR CHEMOTHER HORMON ANTINEOPL SUB-Q/IM: ICD-10-PCS | Mod: S$GLB,,, | Performed by: NURSE PRACTITIONER

## 2020-02-28 PROCEDURE — 96402 CHEMO HORMON ANTINEOPL SQ/IM: CPT | Mod: S$GLB,,, | Performed by: NURSE PRACTITIONER

## 2020-02-28 PROCEDURE — 99213 OFFICE O/P EST LOW 20 MIN: CPT | Mod: 25,S$GLB,, | Performed by: NURSE PRACTITIONER

## 2020-02-28 NOTE — LETTER
February 28, 2020      Abisai Monteiro MD  [address] [address]           [facility] [address] [address]  Phone: [phone]  Fax: [phone]          Patient: Stone Lucio   MR Number: [id number]   YOB: 1921   Date of Visit: 2/28/2020       Dear Dr. Abisai Monteiro:    Thank you for referring Stone Lucio to me for evaluation. Attached you will find relevant portions of my assessment and plan of care.    If you have questions, please do not hesitate to call me. I look forward to following Stone Lucio along with you.    Sincerely,    Ana Mcbride, ALEX    Enclosure  CC:  No Recipients    If you would like to receive this communication electronically, please contact [email or web address] or [phone] to request more information on [employer] access.    For providers and/or their staff who would like to refer a patient to [facility], please contact us through our one-stop-shop provider referral line, [facility], at [phone].    If you feel you have received this communication in error or would no longer like to receive these types of communications, please e-mail [email or web address]

## 2020-02-28 NOTE — PROGRESS NOTES
"Subjective:      Stone Lucio is a 89 y.o. male who returns today regarding his prostate cancer. He is an established patient of Dr. Monteiro' and is new to me today.    Urinary history:   "The patient has been treated in [place] by Dr. Aguiar for proximally 10 years with androgen deprivation and casodex for his prostate cancer. To his knowledge the disease was not metastatic at the time of diagnosis.  He has had four fractures but tells me these preceded his androgen deprivation."     He initially saw Dr. Monteiro in May 2019 and received eligard on 8/23/19. He presents today for eligard injection. Unfortunately labs were not completed prior to today's visit. Most recent PSA was 0.12 in May 2019. He is unsure if he is taking a calcium supplement with vitamin D currently. Denies bothersome urinary symptoms today.     The following portions of the patient's history were reviewed and updated as appropriate: allergies, current medications, past family history, past medical history, past social history, past surgical history and problem list.    Review of Systems  Constitutional: no fever or chills  ENT: no nasal congestion or sore throat  Respiratory: no cough or shortness of breath  Cardiovascular: no chest pain or palpitations  Gastrointestinal: no nausea or vomiting, tolerating diet  Genitourinary: as per HPI  Hematologic/Lymphatic: no easy bruising or lymphadenopathy  Musculoskeletal: no arthralgias or myalgias  Neurological: no seizures or tremors  Behavioral/Psych: no auditory or visual hallucinations     Objective:   Vitals:   Vitals:    02/28/20 1101   BP: (!) 120/58   Pulse: 74       Physical Exam   General: alert and oriented, no acute distress  Head: normocephalic, atraumatic  Neck: supple, no lymphadenopathy, normal ROM, no masses  Respiratory: Symmetric expansion, non-labored breathing  Cardiovascular: regular rate and rhythm, nomal pulses, no peripheral edema  Abdomen: soft, non tender, non distended, no " palpable masses, no hernias, no hepatomegaly or splenomegaly  Genitourinary: deferred  Lymphatic: no inguinal nodes  Skin: normal coloration and turgor, no rashes, no suspicious skin lesions noted  Neuro: alert and oriented x3, no gross deficits  Psych: normal judgment and insight, normal mood/affect and non-anxious  WC bound     Lab Review   Urinalysis demonstrates : no specimen   Lab Results   Component Value Date    WBC 8.82 02/05/2020    HGB 11.3 (L) 02/05/2020    HCT 39.2 (L) 02/05/2020    MCV 84 02/05/2020     02/05/2020     Lab Results   Component Value Date    CREATININE 1.3 02/05/2020    BUN 18 02/05/2020     No results found for: PSA  Imaging   None    Assessment:   Prostate cancer    Plan:   Stone was seen today for follow-up.    Diagnoses and all orders for this visit:    Prostate cancer  -     Prostate Specific Antigen, Diagnostic; Future  -     Testosterone; Future  -     Testosterone; Future  -     Prostate Specific Antigen, Diagnostic; Future    Plan:  --PSA/testosterone today  --Eligard 45 mg today  --Continue/begin calcium + vitamin D and multivitamin daily  --Follow up in 6 months with PSA/testosterone for eligard injection

## 2020-03-03 RX ORDER — PANTOPRAZOLE SODIUM 40 MG/1
40 TABLET, DELAYED RELEASE ORAL DAILY
Qty: 90 TABLET | Refills: 0 | OUTPATIENT
Start: 2020-03-03 | End: 2021-01-01

## 2020-03-11 ENCOUNTER — TELEPHONE (OUTPATIENT)
Dept: CARDIOLOGY | Facility: CLINIC | Age: 85
End: 2020-03-11

## 2020-03-11 DIAGNOSIS — D50.0 ANEMIA DUE TO GI BLOOD LOSS: ICD-10-CM

## 2020-03-11 DIAGNOSIS — I48.20 CHRONIC ATRIAL FIBRILLATION: Primary | ICD-10-CM

## 2020-03-11 NOTE — TELEPHONE ENCOUNTER
Daughter called and ask does he really need to come in for this appointment due to corona virus. Please advise.

## 2020-03-13 ENCOUNTER — TELEPHONE (OUTPATIENT)
Dept: CARDIOLOGY | Facility: CLINIC | Age: 85
End: 2020-03-13

## 2020-03-13 NOTE — TELEPHONE ENCOUNTER
Daughter called and stated as soon as the [facility] let him travel she will go and get the blood work.

## 2020-05-12 ENCOUNTER — CLINICAL SUPPORT (OUTPATIENT)
Dept: CARDIOLOGY | Facility: CLINIC | Age: 85
End: 2020-05-12
Payer: MEDICARE

## 2020-05-12 DIAGNOSIS — Z95.0 CARDIAC PACEMAKER: Primary | ICD-10-CM

## 2020-05-12 PROCEDURE — 93294 REM INTERROG EVL PM/LDLS PM: CPT | Mod: ,,, | Performed by: INTERNAL MEDICINE

## 2020-05-12 PROCEDURE — 99499 UNLISTED E&M SERVICE: CPT | Mod: S$PBB,,, | Performed by: INTERNAL MEDICINE

## 2020-05-12 PROCEDURE — 99499 NO LOS: ICD-10-PCS | Mod: S$PBB,,, | Performed by: INTERNAL MEDICINE

## 2020-05-12 PROCEDURE — 93296 REM INTERROG EVL PM/IDS: CPT

## 2020-05-12 PROCEDURE — 93294 PR INTERROG EVAL, REMOTE, UP TO 90 DAYS,PACEMAKER: ICD-10-PCS | Mod: ,,, | Performed by: INTERNAL MEDICINE

## 2020-05-13 NOTE — PROGRESS NOTES
Subjective:      Patient ID: Stone Lucio is a 89 y.o. male.    Chief Complaint: No chief complaint on file.    HPI:    ROS [employer] remote interrogation of pacemaker report downloaded and prepared by medical assistant and interpreted by me and full report scanned into Epic media.  Battery OK with ROCKY approximately 4 years. Lead OK.  13% RV pacing.  Normal device function.    Past Medical History:   Diagnosis Date    Atrial fibrillation     Cancer     Hyperlipidemia     Hypertension     Prostate cancer     on Lupron        Past Surgical History:   Procedure Laterality Date    CHOLECYSTECTOMY      COLONOSCOPY N/A 2019    Procedure: COLONOSCOPY;  Surgeon: Rosalind Vallecillo MD;  Location: [facility];  Service: Endoscopy;  Laterality: N/A;    INSERT / REPLACE / REMOVE PACEMAKER      JOINT REPLACEMENT      left hip ORIF      SKIN CANCER DESTRUCTION         Family History   Problem Relation Age of Onset    Colon cancer Sister     Colon cancer Sister        Social History     Socioeconomic History    Marital status:      Spouse name: Not on file    Number of children: Not on file    Years of education: Not on file    Highest education level: Not on file   Occupational History    Not on file   Social Needs    Financial resource strain: Not on file    Food insecurity:     Worry: Not on file     Inability: Not on file    Transportation needs:     Medical: Not on file     Non-medical: Not on file   Tobacco Use    Smoking status: Former Smoker     Last attempt to quit: 1990     Years since quittin.0    Smokeless tobacco: Never Used   Substance and Sexual Activity    Alcohol use: Yes    Drug use: Never    Sexual activity: Not Currently   Lifestyle    Physical activity:     Days per week: Not on file     Minutes per session: Not on file    Stress: Not on file   Relationships    Social connections:     Talks on phone: Not on file     Gets together: Not on file      Attends Hindu service: Not on file     Active member of club or organization: Not on file     Attends meetings of clubs or organizations: Not on file     Relationship status: Not on file   Other Topics Concern    Not on file   Social History Narrative    Not on file       Current Outpatient Medications on File Prior to Visit   Medication Sig Dispense Refill    acetaminophen (TYLENOL) 325 MG tablet 1-2 tabs Q8h prn pain  0    amLODIPine (NORVASC) 2.5 MG tablet Take 1 tablet (2.5 mg total) by mouth once daily. 30 tablet 12    apixaban (ELIQUIS) 2.5 mg Tab Take 1 tablet (2.5 mg total) by mouth 2 (two) times daily. 60 tablet 11    ascorbic acid, vitamin C, (VITAMIN C) 500 mg Chew 1 at bedtime.  Chew and swallow  0    bicalutamide (CASODEX) 50 MG Tab Take 1 tablet (50 mg total) by mouth once daily. 30 tablet 12    calcium carbonate (OS-STACY) 500 mg calcium (1,250 mg) tablet Take 1 tablet (500 mg total) by mouth once daily. May use 1-2 tabs q 6h prn indigestion  0    cholecalciferol, vitamin D3, (VITAMIN D3) 1,000 unit capsule Take 1 capsule (1,000 Units total) by mouth once daily.  0    furosemide (LASIX) 20 MG tablet Take 1 tablet (20 mg total) by mouth once daily. 30 tablet 11    loratadine (CLARITIN) 10 mg tablet One every morning for runny nose 30 tablet 12    multivit with min-folic acid (ONE-A-DAY MEN VITACRAVES) 200 mcg Chew 1 daily      pantoprazole (PROTONIX) 40 MG tablet Take 1 tablet (40 mg total) by mouth once daily. 90 tablet 0    simvastatin (ZOCOR) 10 MG tablet Take 1 tablet (10 mg total) by mouth every evening. 30 tablet 11     Current Facility-Administered Medications on File Prior to Visit   Medication Dose Route Frequency Provider Last Rate Last Dose    leuprolide (6 month) (ELIGARD) injection 45 mg  45 mg Subcutaneous Q6 Months Abisai Monteiro MD   45 mg at 02/28/20 1116       Review of patient's allergies indicates:  No Known Allergies  Objective:   There were no vitals filed for  this visit.     Physical Exam     Assessment:     1. Cardiac pacemaker      Plan:   Diagnoses and all orders for this visit:    Cardiac pacemaker         No follow-ups on file.

## 2020-05-22 RX ORDER — PANTOPRAZOLE SODIUM 40 MG/1
40 TABLET, DELAYED RELEASE ORAL DAILY
Qty: 90 TABLET | Refills: 1 | Status: SHIPPED | OUTPATIENT
Start: 2020-05-22 | End: 2020-11-03

## 2020-08-14 ENCOUNTER — CLINICAL SUPPORT (OUTPATIENT)
Dept: CARDIOLOGY | Facility: CLINIC | Age: 85
End: 2020-08-14
Payer: MEDICARE

## 2020-08-14 DIAGNOSIS — Z95.0 CARDIAC PACEMAKER: Primary | ICD-10-CM

## 2020-08-14 PROCEDURE — 93296 REM INTERROG EVL PM/IDS: CPT

## 2020-08-14 PROCEDURE — 93294 PR INTERROG EVAL, REMOTE, UP TO 90 DAYS,PACEMAKER: ICD-10-PCS | Mod: S$PBB,,, | Performed by: INTERNAL MEDICINE

## 2020-08-14 PROCEDURE — 93294 REM INTERROG EVL PM/LDLS PM: CPT | Mod: S$PBB,,, | Performed by: INTERNAL MEDICINE

## 2020-08-14 NOTE — PROGRESS NOTES
Subjective:      Patient ID: Stone Lucio is a 89 y.o. male.    Chief Complaint: No chief complaint on file.    HPI:    ROS [employer] remote pacemaker interrogation report downloaded and prepared by medical assistant and interpreted by me and full report scanned into Epic media.  Battery OK with ROCKY 4 years.  Lead OK.  Normal device function.    Past Medical History:   Diagnosis Date    Atrial fibrillation     Cancer     Hyperlipidemia     Hypertension     Prostate cancer     on Lupron        Past Surgical History:   Procedure Laterality Date    CHOLECYSTECTOMY      COLONOSCOPY N/A 2019    Procedure: COLONOSCOPY;  Surgeon: Rosalind Vallecillo MD;  [facility];  Service: Endoscopy;  Laterality: N/A;    INSERT / REPLACE / REMOVE PACEMAKER      JOINT REPLACEMENT      left hip ORIF      SKIN CANCER DESTRUCTION         Family History   Problem Relation Age of Onset    Colon cancer Sister     Colon cancer Sister        Social History     Socioeconomic History    Marital status:      Spouse name: Not on file    Number of children: Not on file    Years of education: Not on file    Highest education level: Not on file   Occupational History    Not on file   Social Needs    Financial resource strain: Not on file    Food insecurity     Worry: Not on file     Inability: Not on file    Transportation needs     Medical: Not on file     Non-medical: Not on file   Tobacco Use    Smoking status: Former Smoker     Quit date: 1990     Years since quittin.3    Smokeless tobacco: Never Used   Substance and Sexual Activity    Alcohol use: Yes    Drug use: Never    Sexual activity: Not Currently   Lifestyle    Physical activity     Days per week: Not on file     Minutes per session: Not on file    Stress: Not on file   Relationships    Social connections     Talks on phone: Not on file     Gets together: Not on file     Attends Restoration service: Not on file     Active  member of club or organization: Not on file     Attends meetings of clubs or organizations: Not on file     Relationship status: Not on file   Other Topics Concern    Not on file   Social History Narrative    Not on file       Current Outpatient Medications on File Prior to Visit   Medication Sig Dispense Refill    acetaminophen (TYLENOL) 325 MG tablet 1-2 tabs Q8h prn pain  0    amLODIPine (NORVASC) 2.5 MG tablet Take 1 tablet (2.5 mg total) by mouth once daily. 30 tablet 12    apixaban (ELIQUIS) 2.5 mg Tab Take 1 tablet (2.5 mg total) by mouth 2 (two) times daily. 60 tablet 11    ascorbic acid, vitamin C, (VITAMIN C) 500 mg Chew 1 at bedtime.  Chew and swallow  0    bicalutamide (CASODEX) 50 MG Tab Take 1 tablet (50 mg total) by mouth once daily. 30 tablet 12    calcium carbonate (OS-STACY) 500 mg calcium (1,250 mg) tablet Take 1 tablet (500 mg total) by mouth once daily. May use 1-2 tabs q 6h prn indigestion  0    cholecalciferol, vitamin D3, (VITAMIN D3) 1,000 unit capsule Take 1 capsule (1,000 Units total) by mouth once daily.  0    furosemide (LASIX) 20 MG tablet Take 1 tablet (20 mg total) by mouth once daily. 30 tablet 11    loratadine (CLARITIN) 10 mg tablet One every morning for runny nose 30 tablet 12    multivit with min-folic acid (ONE-A-DAY MEN VITACRAVES) 200 mcg Chew 1 daily      pantoprazole (PROTONIX) 40 MG tablet Take 1 tablet (40 mg total) by mouth once daily. 90 tablet 1    simvastatin (ZOCOR) 10 MG tablet Take 1 tablet (10 mg total) by mouth every evening. 30 tablet 11     Current Facility-Administered Medications on File Prior to Visit   Medication Dose Route Frequency Provider Last Rate Last Dose    leuprolide (6 month) (ELIGARD) injection 45 mg  45 mg Subcutaneous Q6 Months Abisai Monteiro MD   45 mg at 02/28/20 6270       Review of patient's allergies indicates:  No Known Allergies  Objective:   There were no vitals filed for this visit.     Physical Exam     Assessment:      1. Cardiac pacemaker      Plan:   Diagnoses and all orders for this visit:    Cardiac pacemaker         No follow-ups on file.

## 2020-08-26 ENCOUNTER — TELEPHONE (OUTPATIENT)
Dept: UROLOGY | Facility: CLINIC | Age: 85
End: 2020-08-26

## 2020-08-26 NOTE — TELEPHONE ENCOUNTER
"Per patient's wife, pt has "gotten weaker" and wants to check with MD before appt. Wishes to cancel at this time and will call to r/s.  "

## 2020-08-31 ENCOUNTER — TELEPHONE (OUTPATIENT)
Dept: UROLOGY | Facility: CLINIC | Age: 85
End: 2020-08-31

## 2020-08-31 NOTE — TELEPHONE ENCOUNTER
"Patient's wife informed normally assisted living facility was unable to do Eligard shots, but to reach out to facility providers to determine if that is an option.    ----- Message from Genesis Kurtz MA sent at 8/31/2020  2:43 PM CDT -----  Regarding: FW: Ely Chiang "wife"  [phone]    ----- Message -----  From: Cori Greenwood  Sent: 8/31/2020   2:27 PM CDT  To: [facility] Staff  Subject: Ely Chiang "wife"  [phone]               .Type: Patient Call Back    Who called: Ely Chiang  wife    What is the request in detail: Requesting to know if pt can get prostate shot at [facility]     Can the clinic reply by [facility]? Mike back     Would the patient rather a call back or a response via [facility]?  Call back     Best call back number:  [phone]              "

## 2020-09-03 ENCOUNTER — TELEPHONE (OUTPATIENT)
Dept: UROLOGY | Facility: CLINIC | Age: 85
End: 2020-09-03

## 2020-09-03 NOTE — TELEPHONE ENCOUNTER
Patient's wife states that [facility], the group that administers her 's other medications, said they could give Eligard inj if medication was sent to them. States patient is 89 and has great difficulty leaving nursing home. Do you know how we'd go about ordering this? Thanks!    ----- Message from Lai Jimenes sent at 9/3/2020  3:07 PM CDT -----  Regarding: Andrés Contreras     Patients wife called and would like to know if patient can have Eligard injection done at [facility] and if so can an order be placed. Also requested Eligard RX be sent to pharmacy. Please advise    Thanks!  Lai

## 2020-09-04 ENCOUNTER — TELEPHONE (OUTPATIENT)
Dept: UROLOGY | Facility: CLINIC | Age: 85
End: 2020-09-04

## 2020-09-04 DIAGNOSIS — C61 PROSTATE CANCER: Primary | ICD-10-CM

## 2020-09-04 RX ORDER — LEUPROLIDE ACETATE 45 MG
45 KIT SUBCUTANEOUS
Qty: 1 KIT | Refills: 0 | Status: SHIPPED | OUTPATIENT
Start: 2020-09-04 | End: 2020-09-05

## 2020-09-04 NOTE — TELEPHONE ENCOUNTER
Spoke to patient's wife- [facility] to [facility].

## 2020-09-06 ENCOUNTER — HOSPITAL ENCOUNTER (EMERGENCY)
Facility: OTHER | Age: 85
Discharge: HOME | End: 2020-09-07
Attending: EMERGENCY MEDICINE
Payer: MEDICARE

## 2020-09-06 VITALS
BODY MASS INDEX: 24.33 KG/M2 | TEMPERATURE: 98 F | OXYGEN SATURATION: 97 % | DIASTOLIC BLOOD PRESSURE: 76 MMHG | SYSTOLIC BLOOD PRESSURE: 182 MMHG | WEIGHT: 160 LBS | RESPIRATION RATE: 18 BRPM | HEART RATE: 62 BPM

## 2020-09-06 DIAGNOSIS — R04.0 EPISTAXIS: Primary | ICD-10-CM

## 2020-09-06 LAB
ALBUMIN SERPL BCP-MCNC: 3.1 G/DL (ref 3.5–5.2)
ALP SERPL-CCNC: 84 U/L (ref 55–135)
ALT SERPL W/O P-5'-P-CCNC: 13 U/L (ref 10–44)
ANION GAP SERPL CALC-SCNC: 9 MMOL/L (ref 8–16)
APTT BLDCRRT: 29.5 SEC (ref 21–32)
AST SERPL-CCNC: 17 U/L (ref 10–40)
BASOPHILS # BLD AUTO: 0.04 K/UL (ref 0–0.2)
BASOPHILS NFR BLD: 0.4 % (ref 0–1.9)
BILIRUB SERPL-MCNC: 0.3 MG/DL (ref 0.1–1)
BUN SERPL-MCNC: 26 MG/DL (ref 10–30)
CALCIUM SERPL-MCNC: 10 MG/DL (ref 8.7–10.5)
CHLORIDE SERPL-SCNC: 109 MMOL/L (ref 95–110)
CO2 SERPL-SCNC: 24 MMOL/L (ref 23–29)
CREAT SERPL-MCNC: 1.2 MG/DL (ref 0.5–1.4)
DIFFERENTIAL METHOD: ABNORMAL
EOSINOPHIL # BLD AUTO: 0.3 K/UL (ref 0–0.5)
EOSINOPHIL NFR BLD: 3 % (ref 0–8)
ERYTHROCYTE [DISTWIDTH] IN BLOOD BY AUTOMATED COUNT: 17.4 % (ref 11.5–14.5)
EST. GFR  (AFRICAN AMERICAN): 57 ML/MIN/1.73 M^2
EST. GFR  (NON AFRICAN AMERICAN): 50 ML/MIN/1.73 M^2
GLUCOSE SERPL-MCNC: 103 MG/DL (ref 70–110)
HCT VFR BLD AUTO: 38.4 % (ref 40–54)
HGB BLD-MCNC: 11.7 G/DL (ref 14–18)
IMM GRANULOCYTES # BLD AUTO: 0.05 K/UL (ref 0–0.04)
IMM GRANULOCYTES NFR BLD AUTO: 0.4 % (ref 0–0.5)
INR PPP: 1.1 (ref 0.8–1.2)
LYMPHOCYTES # BLD AUTO: 2 K/UL (ref 1–4.8)
LYMPHOCYTES NFR BLD: 17.6 % (ref 18–48)
MCH RBC QN AUTO: 26.4 PG (ref 27–31)
MCHC RBC AUTO-ENTMCNC: 30.5 G/DL (ref 32–36)
MCV RBC AUTO: 87 FL (ref 82–98)
MONOCYTES # BLD AUTO: 0.6 K/UL (ref 0.3–1)
MONOCYTES NFR BLD: 5.5 % (ref 4–15)
NEUTROPHILS # BLD AUTO: 8.3 K/UL (ref 1.8–7.7)
NEUTROPHILS NFR BLD: 73.1 % (ref 38–73)
NRBC BLD-RTO: 0 /100 WBC
PLATELET # BLD AUTO: 291 K/UL (ref 150–350)
PMV BLD AUTO: 9.7 FL (ref 9.2–12.9)
POTASSIUM SERPL-SCNC: 4.5 MMOL/L (ref 3.5–5.1)
PROT SERPL-MCNC: 6.4 G/DL (ref 6–8.4)
PROTHROMBIN TIME: 11.4 SEC (ref 9–12.5)
RBC # BLD AUTO: 4.44 M/UL (ref 4.6–6.2)
SODIUM SERPL-SCNC: 142 MMOL/L (ref 136–145)
WBC # BLD AUTO: 11.35 K/UL (ref 3.9–12.7)

## 2020-09-06 PROCEDURE — 99283 EMERGENCY DEPT VISIT LOW MDM: CPT

## 2020-09-06 PROCEDURE — 85025 COMPLETE CBC W/AUTO DIFF WBC: CPT

## 2020-09-06 PROCEDURE — 80053 COMPREHEN METABOLIC PANEL: CPT

## 2020-09-06 PROCEDURE — 85610 PROTHROMBIN TIME: CPT

## 2020-09-06 PROCEDURE — 85730 THROMBOPLASTIN TIME PARTIAL: CPT

## 2020-09-06 NOTE — ED TRIAGE NOTES
"Pt reports to ED via EMS with c/o epistaxis that began last night. Pt reports that the nosebleed was intermittent thorughout the day, "was bleeding a fair amount when it really got going." Pt is reporting some mild weakness and dizziness. Bleeding controlled on arrival to ED.  Pt is on prescribed blood thinners.  "

## 2020-09-07 NOTE — ED PROVIDER NOTES
Encounter Date: 9/6/2020       History     Chief Complaint   Patient presents with    Epistaxis     Per EMS, pt w/ nose bleed that started this AM, stopped, then returned again this evening, activated EMS... bleeding controlled     Patient is 89-year-old male history of cancer, atrial fibrillation, hypertension, hyperlipidemia, prostate cancer who presents with complaints of left-sided nose bleed that has been bleeding on and off all day today and is currently not actively bleeding.  He was brought by EMS after having on and off nose bleed all day today.  Patient reports he awoke with blood-stained sheets and blood coming from his left nostril.  He reports no pain.  He reports a couple of times throughout the day he was able to obtain hemostasis but admits that ultimately he was encouraged to come to the emergency department for evaluation.  Currently the nose is not bleeding.  He denies any recent pain, headache, dizziness, nausea or vomiting.  Has no chest pain or shortness of breath.  Denies bleeding and stool, urin from mouth.  Denies any nausea or vomiting.  Does report he had some diarrhea earlier today which is not terribly unusual for him.  He has been compliant with his routine medications.  He is currently unaccompanied in the emergency department.        Review of patient's allergies indicates:  No Known Allergies  Past Medical History:   Diagnosis Date    Atrial fibrillation     Cancer     Hyperlipidemia     Hypertension     Prostate cancer     on Lupron     Past Surgical History:   Procedure Laterality Date    CHOLECYSTECTOMY      COLONOSCOPY N/A 11/21/2019    Procedure: COLONOSCOPY;  Surgeon: Rosalind Vallecillo MD;  [facility];  Service: Endoscopy;  Laterality: N/A;    INSERT / REPLACE / REMOVE PACEMAKER      JOINT REPLACEMENT      left hip ORIF      SKIN CANCER DESTRUCTION       Family History   Problem Relation Age of Onset    Colon cancer Sister     Colon cancer Sister       Social History     Tobacco Use    Smoking status: Former Smoker     Quit date: 1990     Years since quittin.4    Smokeless tobacco: Never Used   Substance Use Topics    Alcohol use: Not Currently    Drug use: Never     Review of Systems   Constitutional: Negative for fever.   HENT: Negative for sore throat.         Nose bleed   Respiratory: Negative for shortness of breath.    Cardiovascular: Negative for chest pain.   Gastrointestinal: Negative for nausea.   Genitourinary: Negative for dysuria.   Musculoskeletal: Negative for back pain.   Skin: Negative for rash.   Neurological: Negative for weakness.   Hematological: Does not bruise/bleed easily.       Physical Exam     Initial Vitals [20 1825]   BP Pulse Resp Temp SpO2   (!) 123/55 64 18 97.8 °F (36.6 °C) 97 %      MAP       --         Physical Exam    Nursing note and vitals reviewed.  Constitutional: He appears well-developed and well-nourished.   Elderly male in no acute distress or obvious pain.  Makes good eye contact, speaks in clear full sentences and is cooperative.  Patient is fairly sharp and appears younger than stated age.  No active bleeding on my exam.   HENT:   Head: Normocephalic and atraumatic.   Left nostril has area of abrasion with clot in place near capsule [place] plexus.  No active bleeding.   Right nostril is clear.  Oropharynx is clear     Eyes: Conjunctivae and EOM are normal. Pupils are equal, round, and reactive to light. Right eye exhibits no discharge. Left eye exhibits no discharge. No scleral icterus.   Neck: Normal range of motion.   Cardiovascular: Normal rate, regular rhythm and normal heart sounds.   Pulmonary/Chest: Breath sounds normal.   Abdominal: Soft.   Benign abdomen   Musculoskeletal: Normal range of motion. No tenderness or edema.   Lymphadenopathy:     He has no cervical adenopathy.   Neurological: He is alert and oriented to person, place, and time. He has normal strength. GCS score is 15.  GCS eye subscore is 4. GCS verbal subscore is 5. GCS motor subscore is 6.   Skin: Skin is warm. Capillary refill takes less than 2 seconds. No rash and no abscess noted. No erythema.   Psychiatric: He has a normal mood and affect. His behavior is normal. Thought content normal.         ED Course   Procedures  Labs Reviewed - No data to display        Labs Reviewed   CBC W/ AUTO DIFFERENTIAL - Abnormal; Notable for the following components:       Result Value    RBC 4.44 (*)     Hemoglobin 11.7 (*)     Hematocrit 38.4 (*)     Mean Corpuscular Hemoglobin 26.4 (*)     Mean Corpuscular Hemoglobin Conc 30.5 (*)     RDW 17.4 (*)     Gran # (ANC) 8.3 (*)     Immature Grans (Abs) 0.05 (*)     Gran% 73.1 (*)     Lymph% 17.6 (*)     All other components within normal limits   COMPREHENSIVE METABOLIC PANEL - Abnormal; Notable for the following components:    Albumin 3.1 (*)     eGFR if  57 (*)     eGFR if non  50 (*)     All other components within normal limits   APTT   PROTIME-INR          Medical Decision Making:   ED Management:  Urgent evaluation of 89-year-old male who presents with complaints of left nostril nose bleed on chronic anticoagulation with Coumadin.  He is afebrile, nontoxic appearing, hemodynamically stable.  Physical exam outlined above and reveals suspected culprit of abrasion to the capsule back plexus in the left nostril with current hemostasis attained without intervention.  Labs pending to evaluate for clotting factors.  Will continue to monitor closely.    Update:  Labs reveal non acute anemia of 11 and 38 consistent with baseline.  No acute electrolyte abnormalities.  No coagulopathies appreciated.  No recurrent bleeding.  Stable vital signs.  Patient felt safe for discharge.  Encouraged not to pick his nose.  He verbalizes understanding and is amenable to plan.  He is stable for discharge.                                 Clinical Impression:       ICD-10-CM ICD-9-CM    1. Epistaxis  R04.0 784.7                                Jessica Garrett PA-C  09/06/20 0547

## 2020-09-08 ENCOUNTER — TELEPHONE (OUTPATIENT)
Dept: UROLOGY | Facility: CLINIC | Age: 85
End: 2020-09-08

## 2020-09-08 NOTE — TELEPHONE ENCOUNTER
Confirmed patient's wife had received rx    ----- Message from Elizabeth Tyler sent at 9/8/2020  1:50 PM CDT -----  Type: Patient Call Back    Who called: Ely    What is the request in detail: Ely stated that she did not receive the e-mail regarding RX. She would like to speak to the staff regarding this matter.    Can the clinic reply by [facility]? No    Would the patient rather a call back or a response via [facility]?  Call back    Best call back number: [phone]    Thank you.

## 2020-11-16 ENCOUNTER — CLINICAL SUPPORT (OUTPATIENT)
Dept: CARDIOLOGY | Facility: CLINIC | Age: 85
End: 2020-11-16
Payer: MEDICARE

## 2020-11-16 DIAGNOSIS — Z95.0 CARDIAC PACEMAKER: Primary | ICD-10-CM

## 2020-11-16 PROCEDURE — 93294 REM INTERROG EVL PM/LDLS PM: CPT | Mod: ,,, | Performed by: INTERNAL MEDICINE

## 2020-11-16 PROCEDURE — 93294 PR INTERROG EVAL, REMOTE, UP TO 90 DAYS,PACEMAKER: ICD-10-PCS | Mod: ,,, | Performed by: INTERNAL MEDICINE

## 2020-11-18 NOTE — PROGRESS NOTES
Subjective:      Patient ID: Stone Lucio is a 89 y.o. male.    Chief Complaint: No chief complaint on file.    HPI:    ROS Q-go remote interrogation of pacemaker report downloaded and prepared by medical assistant and interpreted by me and full report scanned into Epic media.   Battery OK with ROCKY approximateley 3.5 years.  Lead OK.  14% paced.  Rare nonsustained rapid ventricular response. Known chronic a fib on Eliquis.  Normal device function.    Past Medical History:   Diagnosis Date    Atrial fibrillation     Cancer     Hyperlipidemia     Hypertension     Prostate cancer     on Lupron        Past Surgical History:   Procedure Laterality Date    CHOLECYSTECTOMY      COLONOSCOPY N/A 2019    Procedure: COLONOSCOPY;  Surgeon: Rosalind Vallecillo MD;  Location: [facility];  Service: Endoscopy;  Laterality: N/A;    INSERT / REPLACE / REMOVE PACEMAKER      JOINT REPLACEMENT      left hip ORIF      SKIN CANCER DESTRUCTION         Family History   Problem Relation Age of Onset    Colon cancer Sister     Colon cancer Sister        Social History     Socioeconomic History    Marital status:      Spouse name: Not on file    Number of children: Not on file    Years of education: Not on file    Highest education level: Not on file   Occupational History    Not on file   Social Needs    Financial resource strain: Not on file    Food insecurity     Worry: Not on file     Inability: Not on file    Transportation needs     Medical: Not on file     Non-medical: Not on file   Tobacco Use    Smoking status: Former Smoker     Quit date: 1990     Years since quittin.6    Smokeless tobacco: Never Used   Substance and Sexual Activity    Alcohol use: Not Currently    Drug use: Never    Sexual activity: Not Currently   Lifestyle    Physical activity     Days per week: Not on file     Minutes per session: Not on file    Stress: Not on file   Relationships    Social  connections     Talks on phone: Not on file     Gets together: Not on file     Attends Rastafari service: Not on file     Active member of club or organization: Not on file     Attends meetings of clubs or organizations: Not on file     Relationship status: Not on file   Other Topics Concern    Not on file   Social History Narrative    Not on file       Current Outpatient Medications on File Prior to Visit   Medication Sig Dispense Refill    acetaminophen (TYLENOL) 325 MG tablet 1-2 tabs Q8h prn pain  0    amLODIPine (NORVASC) 2.5 MG tablet Take 1 tablet (2.5 mg total) by mouth once daily. 30 tablet 12    apixaban (ELIQUIS) 2.5 mg Tab Take 1 tablet (2.5 mg total) by mouth 2 (two) times daily. 60 tablet 11    ascorbic acid, vitamin C, (VITAMIN C) 500 mg Chew 1 at bedtime.  Chew and swallow  0    bicalutamide (CASODEX) 50 MG Tab Take 1 tablet (50 mg total) by mouth once daily. 30 tablet 12    calcium carbonate (OS-STACY) 500 mg calcium (1,250 mg) tablet Take 1 tablet (500 mg total) by mouth once daily. May use 1-2 tabs q 6h prn indigestion  0    cholecalciferol, vitamin D3, (VITAMIN D3) 1,000 unit capsule Take 1 capsule (1,000 Units total) by mouth once daily.  0    furosemide (LASIX) 20 MG tablet Take 1 tablet (20 mg total) by mouth once daily. 30 tablet 11    leuprolide, 6 month, (ELIGARD, 6 MONTH,) 45 mg injection Inject 45 mg into the skin every 6 (six) months. for 1 dose 1 kit 0    loratadine (ALLERGY RELIEF, LORATADINE,) 10 mg tablet TAKE ONE TABLET BY MOUTH DAILY FOR runny-nose/allergies 30 tablet 12    multivit with min-folic acid (ONE-A-DAY MEN VITACRAVES) 200 mcg Chew 1 daily      pantoprazole (PROTONIX) 40 MG tablet Take 1 tablet (40 mg total) by mouth once daily. 90 tablet 1    simvastatin (ZOCOR) 10 MG tablet Take 1 tablet (10 mg total) by mouth every evening. 30 tablet 11     Current Facility-Administered Medications on File Prior to Visit   Medication Dose Route Frequency Provider Last Rate  Last Dose    leuprolide (6 month) (ELIGARD) injection 45 mg  45 mg Subcutaneous Q6 Months Abisai Monteiro MD   45 mg at 02/28/20 1115       Review of patient's allergies indicates:  No Known Allergies  Objective:   There were no vitals filed for this visit.     Physical Exam     Assessment:     1. Cardiac pacemaker      Plan:   Diagnoses and all orders for this visit:    Cardiac pacemaker         No follow-ups on file.

## 2020-12-29 NOTE — TELEPHONE ENCOUNTER
Received refill request from patients pharmacy.  Patient is due for follow visit.  Phoned patient. No answer, left message on voice mail.

## 2021-01-01 ENCOUNTER — CLINICAL SUPPORT (OUTPATIENT)
Dept: CARDIOLOGY | Facility: CLINIC | Age: 86
End: 2021-01-01
Payer: MEDICARE

## 2021-01-01 ENCOUNTER — OFFICE VISIT (OUTPATIENT)
Dept: CARDIOLOGY | Facility: CLINIC | Age: 86
End: 2021-01-01
Payer: MEDICARE

## 2021-01-01 ENCOUNTER — TELEPHONE (OUTPATIENT)
Dept: CARDIOLOGY | Facility: CLINIC | Age: 86
End: 2021-01-01

## 2021-01-01 DIAGNOSIS — Z95.0 CARDIAC PACEMAKER: Primary | ICD-10-CM

## 2021-01-01 DIAGNOSIS — Z79.01 WARFARIN ANTICOAGULATION: ICD-10-CM

## 2021-01-01 DIAGNOSIS — I48.20 CHRONIC ATRIAL FIBRILLATION: ICD-10-CM

## 2021-01-01 DIAGNOSIS — I10 ESSENTIAL HYPERTENSION: ICD-10-CM

## 2021-01-01 DIAGNOSIS — E78.00 PURE HYPERCHOLESTEROLEMIA: ICD-10-CM

## 2021-01-01 PROCEDURE — 93294 REM INTERROG EVL PM/LDLS PM: CPT | Mod: ,,, | Performed by: INTERNAL MEDICINE

## 2021-01-01 PROCEDURE — 93294 PR INTERROG EVAL, REMOTE, UP TO 90 DAYS,PACEMAKER: ICD-10-PCS | Mod: ,,, | Performed by: INTERNAL MEDICINE

## 2021-01-01 PROCEDURE — 99213 PR OFFICE/OUTPT VISIT, EST, LEVL III, 20-29 MIN: ICD-10-PCS | Mod: 95,,, | Performed by: INTERNAL MEDICINE

## 2021-01-01 PROCEDURE — 99213 OFFICE O/P EST LOW 20 MIN: CPT | Mod: 95,,, | Performed by: INTERNAL MEDICINE

## 2022-03-29 ENCOUNTER — TELEPHONE (OUTPATIENT)
Dept: CARDIOLOGY | Facility: CLINIC | Age: 87
End: 2022-03-29
Payer: MEDICARE

## 2022-03-29 NOTE — TELEPHONE ENCOUNTER
Reached out to patient's wife Ely.  Patient's remote monitor is not connected.  Left message to check monitor and call office for any problems.